# Patient Record
Sex: FEMALE | Race: BLACK OR AFRICAN AMERICAN | Employment: UNEMPLOYED | URBAN - METROPOLITAN AREA
[De-identification: names, ages, dates, MRNs, and addresses within clinical notes are randomized per-mention and may not be internally consistent; named-entity substitution may affect disease eponyms.]

---

## 2017-09-11 PROBLEM — F31.9 BIPOLAR DISORDER (HCC): Status: ACTIVE | Noted: 2017-09-11

## 2017-09-11 PROBLEM — F40.01 PANIC DISORDER WITH AGORAPHOBIA: Status: ACTIVE | Noted: 2017-09-11

## 2019-02-07 ENCOUNTER — HOSPITAL ENCOUNTER (EMERGENCY)
Age: 29
Discharge: HOME | End: 2019-02-07
Payer: MEDICAID

## 2019-02-07 VITALS
OXYGEN SATURATION: 98 % | RESPIRATION RATE: 14 BRPM | TEMPERATURE: 98.4 F | HEART RATE: 77 BPM | HEIGHT: 68 IN | BODY MASS INDEX: 20 KG/M2 | DIASTOLIC BLOOD PRESSURE: 70 MMHG | WEIGHT: 132 LBS | SYSTOLIC BLOOD PRESSURE: 108 MMHG

## 2019-02-07 DIAGNOSIS — G35 MULTIPLE SCLEROSIS (HCC): Primary | ICD-10-CM

## 2019-02-07 DIAGNOSIS — R52 BODY ACHES: ICD-10-CM

## 2019-02-07 LAB
ALBUMIN SERPL-MCNC: 4 G/DL (ref 3.5–5.2)
ALP BLD-CCNC: 52 U/L (ref 35–104)
ALT SERPL-CCNC: 9 U/L (ref 0–32)
ANION GAP SERPL CALCULATED.3IONS-SCNC: 10 MMOL/L (ref 7–16)
AST SERPL-CCNC: 13 U/L (ref 0–31)
BACTERIA: ABNORMAL /HPF
BASOPHILS ABSOLUTE: 0.05 E9/L (ref 0–0.2)
BASOPHILS RELATIVE PERCENT: 0.6 % (ref 0–2)
BILIRUB SERPL-MCNC: 0.4 MG/DL (ref 0–1.2)
BILIRUBIN URINE: ABNORMAL
BLOOD, URINE: NEGATIVE
BUN BLDV-MCNC: 12 MG/DL (ref 6–20)
C-REACTIVE PROTEIN: 0.3 MG/DL (ref 0–0.4)
CALCIUM SERPL-MCNC: 9.1 MG/DL (ref 8.6–10.2)
CHLORIDE BLD-SCNC: 103 MMOL/L (ref 98–107)
CLARITY: CLEAR
CO2: 23 MMOL/L (ref 22–29)
COLOR: YELLOW
CREAT SERPL-MCNC: 0.7 MG/DL (ref 0.5–1)
EOSINOPHILS ABSOLUTE: 0.1 E9/L (ref 0.05–0.5)
EOSINOPHILS RELATIVE PERCENT: 1.2 % (ref 0–6)
EPITHELIAL CELLS, UA: ABNORMAL /HPF
GFR AFRICAN AMERICAN: >60
GFR NON-AFRICAN AMERICAN: >60 ML/MIN/1.73
GLUCOSE BLD-MCNC: 92 MG/DL (ref 74–99)
GLUCOSE URINE: NEGATIVE MG/DL
HCG, URINE, POC: NEGATIVE
HCT VFR BLD CALC: 40.6 % (ref 34–48)
HEMOGLOBIN: 13.7 G/DL (ref 11.5–15.5)
IMMATURE GRANULOCYTES #: 0.02 E9/L
IMMATURE GRANULOCYTES %: 0.2 % (ref 0–5)
KETONES, URINE: 15 MG/DL
LACTIC ACID: 1.2 MMOL/L (ref 0.5–2.2)
LEUKOCYTE ESTERASE, URINE: ABNORMAL
LIPASE: 33 U/L (ref 13–60)
LYMPHOCYTES ABSOLUTE: 2.88 E9/L (ref 1.5–4)
LYMPHOCYTES RELATIVE PERCENT: 35.9 % (ref 20–42)
Lab: NORMAL
MCH RBC QN AUTO: 28.3 PG (ref 26–35)
MCHC RBC AUTO-ENTMCNC: 33.7 % (ref 32–34.5)
MCV RBC AUTO: 83.9 FL (ref 80–99.9)
MONOCYTES ABSOLUTE: 0.41 E9/L (ref 0.1–0.95)
MONOCYTES RELATIVE PERCENT: 5.1 % (ref 2–12)
NEGATIVE QC PASS/FAIL: NORMAL
NEUTROPHILS ABSOLUTE: 4.56 E9/L (ref 1.8–7.3)
NEUTROPHILS RELATIVE PERCENT: 57 % (ref 43–80)
NITRITE, URINE: NEGATIVE
PDW BLD-RTO: 14.8 FL (ref 11.5–15)
PH UA: 6 (ref 5–9)
PLATELET # BLD: 222 E9/L (ref 130–450)
PMV BLD AUTO: 11.8 FL (ref 7–12)
POSITIVE QC PASS/FAIL: NORMAL
POTASSIUM REFLEX MAGNESIUM: 4.2 MMOL/L (ref 3.5–5)
PROTEIN UA: NEGATIVE MG/DL
RBC # BLD: 4.84 E12/L (ref 3.5–5.5)
RBC UA: ABNORMAL /HPF (ref 0–2)
SEDIMENTATION RATE, ERYTHROCYTE: 2 MM/HR (ref 0–20)
SODIUM BLD-SCNC: 136 MMOL/L (ref 132–146)
SPECIFIC GRAVITY UA: 1.02 (ref 1–1.03)
TOTAL PROTEIN: 7.1 G/DL (ref 6.4–8.3)
UROBILINOGEN, URINE: 0.2 E.U./DL
WBC # BLD: 8 E9/L (ref 4.5–11.5)
WBC UA: ABNORMAL /HPF (ref 0–5)

## 2019-02-07 PROCEDURE — 86140 C-REACTIVE PROTEIN: CPT

## 2019-02-07 PROCEDURE — 83605 ASSAY OF LACTIC ACID: CPT

## 2019-02-07 PROCEDURE — 85025 COMPLETE CBC W/AUTO DIFF WBC: CPT

## 2019-02-07 PROCEDURE — 6360000002 HC RX W HCPCS: Performed by: NURSE PRACTITIONER

## 2019-02-07 PROCEDURE — 96374 THER/PROPH/DIAG INJ IV PUSH: CPT

## 2019-02-07 PROCEDURE — 2580000003 HC RX 258: Performed by: NURSE PRACTITIONER

## 2019-02-07 PROCEDURE — 80053 COMPREHEN METABOLIC PANEL: CPT

## 2019-02-07 PROCEDURE — 36415 COLL VENOUS BLD VENIPUNCTURE: CPT

## 2019-02-07 PROCEDURE — 99283 EMERGENCY DEPT VISIT LOW MDM: CPT

## 2019-02-07 PROCEDURE — 81001 URINALYSIS AUTO W/SCOPE: CPT

## 2019-02-07 PROCEDURE — 96375 TX/PRO/DX INJ NEW DRUG ADDON: CPT

## 2019-02-07 PROCEDURE — 83690 ASSAY OF LIPASE: CPT

## 2019-02-07 PROCEDURE — 85651 RBC SED RATE NONAUTOMATED: CPT

## 2019-02-07 RX ORDER — KETOROLAC TROMETHAMINE 30 MG/ML
15 INJECTION, SOLUTION INTRAMUSCULAR; INTRAVENOUS ONCE
Status: COMPLETED | OUTPATIENT
Start: 2019-02-07 | End: 2019-02-07

## 2019-02-07 RX ORDER — PREDNISONE 50 MG/1
500 TABLET ORAL DAILY
Qty: 91 TABLET | Refills: 0 | Status: SHIPPED | OUTPATIENT
Start: 2019-02-07 | End: 2019-02-07

## 2019-02-07 RX ORDER — PREDNISONE 50 MG/1
500 TABLET ORAL DAILY
Qty: 91 TABLET | Refills: 0 | Status: SHIPPED | OUTPATIENT
Start: 2019-02-07 | End: 2019-02-24

## 2019-02-07 RX ORDER — METHYLPREDNISOLONE SODIUM SUCCINATE 125 MG/2ML
125 INJECTION, POWDER, LYOPHILIZED, FOR SOLUTION INTRAMUSCULAR; INTRAVENOUS ONCE
Status: COMPLETED | OUTPATIENT
Start: 2019-02-07 | End: 2019-02-07

## 2019-02-07 RX ORDER — 0.9 % SODIUM CHLORIDE 0.9 %
1000 INTRAVENOUS SOLUTION INTRAVENOUS ONCE
Status: COMPLETED | OUTPATIENT
Start: 2019-02-07 | End: 2019-02-07

## 2019-02-07 RX ORDER — METOCLOPRAMIDE HYDROCHLORIDE 5 MG/ML
10 INJECTION INTRAMUSCULAR; INTRAVENOUS ONCE
Status: COMPLETED | OUTPATIENT
Start: 2019-02-07 | End: 2019-02-07

## 2019-02-07 RX ORDER — DIPHENHYDRAMINE HYDROCHLORIDE 50 MG/ML
25 INJECTION INTRAMUSCULAR; INTRAVENOUS ONCE
Status: COMPLETED | OUTPATIENT
Start: 2019-02-07 | End: 2019-02-07

## 2019-02-07 RX ORDER — DULOXETIN HYDROCHLORIDE 20 MG/1
20 CAPSULE, DELAYED RELEASE ORAL DAILY
COMMUNITY
End: 2019-06-25

## 2019-02-07 RX ADMIN — SODIUM CHLORIDE 1000 ML: 9 INJECTION, SOLUTION INTRAVENOUS at 14:23

## 2019-02-07 RX ADMIN — DIPHENHYDRAMINE HYDROCHLORIDE 25 MG: 50 INJECTION, SOLUTION INTRAMUSCULAR; INTRAVENOUS at 14:23

## 2019-02-07 RX ADMIN — METHYLPREDNISOLONE SODIUM SUCCINATE 125 MG: 125 INJECTION, POWDER, FOR SOLUTION INTRAMUSCULAR; INTRAVENOUS at 15:15

## 2019-02-07 RX ADMIN — METOCLOPRAMIDE 10 MG: 5 INJECTION, SOLUTION INTRAMUSCULAR; INTRAVENOUS at 14:24

## 2019-02-07 RX ADMIN — KETOROLAC TROMETHAMINE 15 MG: 30 INJECTION, SOLUTION INTRAMUSCULAR; INTRAVENOUS at 14:23

## 2019-02-07 ASSESSMENT — PAIN SCALES - GENERAL: PAINLEVEL_OUTOF10: 9

## 2019-06-25 ENCOUNTER — HOSPITAL ENCOUNTER (EMERGENCY)
Age: 29
Discharge: HOME | End: 2019-06-25
Attending: EMERGENCY MEDICINE
Payer: MEDICAID

## 2019-06-25 VITALS
TEMPERATURE: 98.9 F | DIASTOLIC BLOOD PRESSURE: 60 MMHG | BODY MASS INDEX: 20.49 KG/M2 | HEIGHT: 64 IN | OXYGEN SATURATION: 98 % | WEIGHT: 120 LBS | SYSTOLIC BLOOD PRESSURE: 110 MMHG | HEART RATE: 94 BPM | RESPIRATION RATE: 20 BRPM

## 2019-06-25 DIAGNOSIS — R10.13 ABDOMINAL PAIN, EPIGASTRIC: Primary | ICD-10-CM

## 2019-06-25 PROCEDURE — 6370000000 HC RX 637 (ALT 250 FOR IP): Performed by: EMERGENCY MEDICINE

## 2019-06-25 PROCEDURE — 99283 EMERGENCY DEPT VISIT LOW MDM: CPT

## 2019-06-25 RX ORDER — FAMOTIDINE 20 MG/1
20 TABLET, FILM COATED ORAL ONCE
Status: COMPLETED | OUTPATIENT
Start: 2019-06-25 | End: 2019-06-25

## 2019-06-25 RX ORDER — RANITIDINE 150 MG/1
150 TABLET ORAL 2 TIMES DAILY
Qty: 60 TABLET | Refills: 0 | Status: SHIPPED | OUTPATIENT
Start: 2019-06-25 | End: 2021-11-16

## 2019-06-25 RX ORDER — ONDANSETRON 4 MG/1
4 TABLET, ORALLY DISINTEGRATING ORAL ONCE
Status: COMPLETED | OUTPATIENT
Start: 2019-06-25 | End: 2019-06-25

## 2019-06-25 RX ORDER — PANTOPRAZOLE SODIUM 20 MG/1
20 TABLET, DELAYED RELEASE ORAL DAILY
COMMUNITY
End: 2021-11-16 | Stop reason: SDUPTHER

## 2019-06-25 RX ORDER — ONDANSETRON 4 MG/1
4 TABLET, ORALLY DISINTEGRATING ORAL EVERY 8 HOURS PRN
Qty: 24 TABLET | Refills: 0 | Status: SHIPPED | OUTPATIENT
Start: 2019-06-25 | End: 2021-11-16

## 2019-06-25 RX ADMIN — FAMOTIDINE 20 MG: 20 TABLET ORAL at 22:46

## 2019-06-25 RX ADMIN — ONDANSETRON 4 MG: 4 TABLET, ORALLY DISINTEGRATING ORAL at 22:46

## 2019-06-25 ASSESSMENT — PAIN DESCRIPTION - ORIENTATION: ORIENTATION: UPPER

## 2019-06-25 ASSESSMENT — PAIN DESCRIPTION - LOCATION: LOCATION: ABDOMEN

## 2019-06-25 ASSESSMENT — PAIN SCALES - GENERAL: PAINLEVEL_OUTOF10: 7

## 2019-06-25 ASSESSMENT — PAIN DESCRIPTION - DESCRIPTORS: DESCRIPTORS: BURNING

## 2019-06-25 ASSESSMENT — PAIN DESCRIPTION - PAIN TYPE: TYPE: ACUTE PAIN

## 2019-06-26 NOTE — ED PROVIDER NOTES
HPI:   Michelle Whitehead is a 34 y.o. female presenting to the ED for gastric pain beginning 2 weeks ago she was placed on ulcer medicine but is not any better [facility]. Paindoesnotradiate. [id number]. Itis associatedwithnausea. She has fibromyalgia and multiple sclerosis. After closer questioning the patient did admit that eating does make the pain better. ROS:   Unless otherwise stated in this report or unable to obtain because of the patient's clinical or mental status as evidenced by the medical record, this patients's positive and negative responses for Review of Systems, constitutional, psych, eyes, ENT, cardiovascular, respiratory, gastrointestinal, neurological, genitourinary, musculoskeletal, integument systems and systems related to the presenting problem are either stated in the preceding or were not pertinent or were negative for the symptoms and/or complaints related to the medical problem. --------------------------------------------- PAST HISTORY ---------------------------------------------  Past Medical History:  has a past medical history of Anxiety, Arm pain, Autism, Back pain, Depression, Fibromyalgia, GERD (gastroesophageal reflux disease), Headache(784.0), Irritable bowel syndrome, Leg pain, Migraines, [facility] Muscle pain, Muscle weakness, and Numbness and tingling. Past Surgical History:  has a past surgical history that includes Vagina surgery (2007) and hernia repair. Social History:  reports that she has never smoked. She has never used smokeless tobacco. She reports that she does not drink alcohol or use drugs. Family History: family history includes Asthma in her paternal grandfather; Diabetes in her maternal grandmother; Hypertension in her father, maternal grandmother, paternal grandfather, and paternal grandmother; Other in her maternal grandmother.      The patients home medications have been reviewed. Allergies: Codeine; Tape [adhesive tape]; and Morphine    -------------------------------------------------- RESULTS -------------------------------------------------  All laboratory and radiology results have been personally reviewed by myself   LABS:  No results found for this visit on 06/25/19. RADIOLOGY:  Interpreted by Radiologist.  No orders to display       ------------------------- NURSING NOTES AND VITALS REVIEWED ---------------------------   The nursing notes within the ED encounter and vital signs as below have been reviewed. /84   Pulse 120   Temp 98.9 °F (37.2 °C) (Oral)   Resp 24   Ht 5' 3.5\" (1.613 m)   Wt 120 lb (54.4 kg)   SpO2 99%   BMI 20.92 kg/m²   Oxygen Saturation Interpretation: Normal      ---------------------------------------------------PHYSICAL EXAM--------------------------------------      Constitutional/General: Alert and oriented x3, well appearing, non toxic in NAD  Head: NC/AT  Eyes: PERRL, EOMI  Mouth: Oropharynx clear, handling secretions, no trismus  Neck: Supple, full ROM,   Pulmonary: Lungs clear to auscultation bilaterally, no wheezes, rales, or rhonchi. Not in respiratory distress  Cardiovascular:  Regular rate and rhythm, no murmurs, gallops, or rubs. 2+ distal pulses  Abdomen: Soft, mild epigastric tenderness without rebound. , non distended,   Extremities: Moves all extremities x 4. Warm and well perfused  Skin: warm and dry without rash  Neurologic: GCS 15,  Psych: Normal Affect      ------------------------------ ED COURSE/MEDICAL DECISION MAKING----------------------  Medications - No data to display      Medical Decision Making:    Show with the epigastric discomfort improved by eating and also some nausea. We will begin her on Zofran and add Zantac to her ultimate antiulcer regimen. I believe she would benefit from an EGD and biopsy to rule out Helicobacter pylori. Counseling:    The emergency provider has

## 2019-06-26 NOTE — ED NOTES
States abd pain x one month. Has appt this week with Dr. Yaneth Burris.      Mere Venegas RN  06/25/19 [id number]

## 2019-08-08 ENCOUNTER — HOSPITAL ENCOUNTER (OUTPATIENT)
Dept: NUCLEAR MEDICINE | Age: 29
Discharge: HOME | End: 2019-08-08
Payer: MEDICAID

## 2019-08-08 DIAGNOSIS — R10.84 ABDOMINAL PAIN, GENERALIZED: ICD-10-CM

## 2019-08-08 DIAGNOSIS — R11.0 NAUSEA: ICD-10-CM

## 2019-08-08 PROCEDURE — 3430000000 HC RX DIAGNOSTIC RADIOPHARMACEUTICAL: Performed by: RADIOLOGY

## 2019-08-08 PROCEDURE — A9541 TC99M SULFUR COLLOID: HCPCS | Performed by: RADIOLOGY

## 2019-08-08 PROCEDURE — 78264 GASTRIC EMPTYING IMG STUDY: CPT

## 2019-08-08 RX ADMIN — Medication 1 MILLICURIE: at 10:10

## 2021-08-02 ENCOUNTER — OFFICE VISIT (OUTPATIENT)
Dept: CARDIOLOGY CLINIC | Age: 31
End: 2021-08-02
Payer: MEDICAID

## 2021-08-02 VITALS
OXYGEN SATURATION: 99 % | HEART RATE: 111 BPM | DIASTOLIC BLOOD PRESSURE: 62 MMHG | SYSTOLIC BLOOD PRESSURE: 110 MMHG | HEIGHT: 64 IN | WEIGHT: 140.4 LBS | RESPIRATION RATE: 19 BRPM | BODY MASS INDEX: 23.97 KG/M2

## 2021-08-02 DIAGNOSIS — R07.9 CHEST PAIN, UNSPECIFIED TYPE: Primary | ICD-10-CM

## 2021-08-02 PROCEDURE — 93000 ELECTROCARDIOGRAM COMPLETE: CPT | Performed by: INTERNAL MEDICINE

## 2021-08-02 PROCEDURE — G8420 CALC BMI NORM PARAMETERS: HCPCS | Performed by: INTERNAL MEDICINE

## 2021-08-02 PROCEDURE — 1036F TOBACCO NON-USER: CPT | Performed by: INTERNAL MEDICINE

## 2021-08-02 PROCEDURE — G8427 DOCREV CUR MEDS BY ELIG CLIN: HCPCS | Performed by: INTERNAL MEDICINE

## 2021-08-02 PROCEDURE — 99203 OFFICE O/P NEW LOW 30 MIN: CPT | Performed by: INTERNAL MEDICINE

## 2021-08-02 RX ORDER — MILNACIPRAN HYDROCHLORIDE 50 MG/1
TABLET, FILM COATED ORAL
COMMUNITY

## 2021-08-02 RX ORDER — MILNACIPRAN HYDROCHLORIDE 100 MG/1
100 TABLET, FILM COATED ORAL EVERY EVENING
COMMUNITY
Start: 2021-08-01

## 2021-08-02 RX ORDER — OXYCODONE HYDROCHLORIDE AND ACETAMINOPHEN 5; 325 MG/1; MG/1
TABLET ORAL
COMMUNITY
Start: 2021-07-16

## 2021-08-02 RX ORDER — FLUDROCORTISONE ACETATE 0.1 MG/1
TABLET ORAL
COMMUNITY
Start: 2021-07-12

## 2021-08-02 RX ORDER — CLONIDINE HYDROCHLORIDE 0.2 MG/1
TABLET ORAL
COMMUNITY
Start: 2021-07-21

## 2021-08-02 RX ORDER — NORETHINDRONE ACETATE AND ETHINYL ESTRADIOL 1; 5 MG/1; UG/1
TABLET ORAL
COMMUNITY

## 2021-08-02 RX ORDER — ACETAMINOPHEN 160 MG
TABLET,DISINTEGRATING ORAL
COMMUNITY
Start: 2021-07-14

## 2021-08-02 RX ORDER — ESCITALOPRAM OXALATE 20 MG/1
TABLET ORAL
COMMUNITY
Start: 2021-06-30

## 2021-08-02 RX ORDER — METOPROLOL SUCCINATE 25 MG/1
12.5 TABLET, EXTENDED RELEASE ORAL DAILY
Qty: 30 TABLET | Refills: 2 | Status: SHIPPED
Start: 2021-08-02 | End: 2021-11-16

## 2021-08-02 RX ORDER — BUPROPION HYDROCHLORIDE 150 MG/1
TABLET ORAL
COMMUNITY
Start: 2021-06-22

## 2021-08-02 ASSESSMENT — ENCOUNTER SYMPTOMS
CONSTIPATION: 0
SHORTNESS OF BREATH: 1
BLOOD IN STOOL: 0
NAUSEA: 1
BACK PAIN: 0
VOMITING: 0
ABDOMINAL PAIN: 0
COUGH: 0
DIARRHEA: 0
WHEEZING: 0

## 2021-08-02 NOTE — PROGRESS NOTES
OUTPATIENT CARDIOLOGY CONSULT    Name: Asuncion Stauffer    Age: 31 y.o. Date of Service: 8/2/2021    Reason for Consultation: Chest pain and POTS. Referring Physician: Perlita Calderon MD    History of Present Illness:  68-year-old female who is referred to me for evaluation due to chest pain and POTS. She has history of multiple sclerosis, fibromyalgia, lumbar radiculopathy, autism, bipolar disorder, panic disorder, GERD, irritable bowel syndrome and migraine. Patient is not active due to her multiple sclerosis. She has been complaining sweating, dizziness while standing up, headache, chest pain and shortness of breath over the past couple of months. She sometimes feels palpitations but denies loss of consciousness. She denies orthopnea, PND or lower extremity edema. Her chest pain can last 20 minutes to an hour and radiates to her back and is rated as 7/10 intensity. It has been occurring at rest but mainly while standing up. Patient was seen by her PCP recently and was found to be hypotensive. She was started on Florinef. EKG done today revealed sinus rhythm at 99 bpm, left atrial enlargement and incomplete right bundle branch block with nonspecific T wave changes. Review of Systems:  Review of Systems   Constitutional: Positive for fatigue. Negative for chills and fever. HENT: Negative for nosebleeds. Respiratory: Positive for shortness of breath. Negative for cough and wheezing. Cardiovascular: Positive for chest pain. Gastrointestinal: Positive for nausea. Negative for abdominal pain, blood in stool, constipation, diarrhea and vomiting. GERD. Genitourinary: Negative for dysuria and hematuria. Musculoskeletal: Negative for back pain, joint swelling and myalgias. Neurological: Positive for light-headedness. Negative for syncope. Imbalance. Psychiatric/Behavioral: The patient is not nervous/anxious.            Past Medical History:  Past Medical History: Diagnosis Date    Anxiety     hospitalized [facility] [place]    Arm pain     8 to 9/10 severity, radiates to hands    Autism     Back pain     8 to 9/10 severity    Depression     hospitalized [facility]    Fibromyalgia     GERD (gastroesophageal reflux disease)     Headache(784.0)     7/10 severity, few times a week    Irritable bowel syndrome     Leg pain     8 to 9/10 severity, radiates to feet    Migraines     10/10 severity, 2-3 per month    [facility]     Muscle pain     Muscle weakness     with loss of tone    Numbness and tingling     back, legs, arms       Past Surgical History:  Past Surgical History:   Procedure Laterality Date    HERNIA REPAIR      Pt was 5 yrs old   [employer] VAGINA SURGERY  2007    Abscess,  Dr. Tellez Prophet       Family History:  Family History   Problem Relation Age of Onset    Diabetes Maternal Grandmother     Hypertension Maternal Grandmother     Other Maternal Grandmother         lymphoma    Asthma Paternal Grandfather     Hypertension Paternal Grandfather     Hypertension Father     Hypertension Paternal Grandmother        Social History:  Social History     Socioeconomic History    Marital status: Single     Spouse name: Not on file    Number of children: Not on file    Years of education: Not on file    Highest education level: Not on file   Occupational History    Not on file   Tobacco Use    Smoking status: Never Smoker    Smokeless tobacco: Never Used   Substance and Sexual Activity    Alcohol use: No    Drug use: No    Sexual activity: Not on file   Other Topics Concern    Not on file   Social History Narrative    Not on file     Social Determinants of Health     Financial Resource Strain:     Difficulty of Paying Living Expenses:    Food Insecurity:     Worried About Running Out of Food in the Last Year:     Ran Out of Food in the Last Year:    Transportation Needs:     Lack of Transportation (Medical):      Lack of Transportation (Non-Medical):    Physical Activity:     Days of Exercise per Week:     Minutes of Exercise per Session:    Stress:     Feeling of Stress :    Social Connections:     Frequency of Communication with Friends and Family:     Frequency of Social Gatherings with Friends and Family:     Attends Yarsani Services:     Active Member of Clubs or Organizations:     Attends Club or Organization Meetings:     Marital Status:    Intimate Partner Violence:     Fear of Current or Ex-Partner:     Emotionally Abused:     Physically Abused:     Sexually Abused: Allergies: Allergies   Allergen Reactions    Codeine Itching    Tape Sharl Gypsum Tape] Itching    Morphine Itching       Current Medications:  Current Outpatient Medications   Medication Sig Dispense Refill    pantoprazole (PROTONIX) 20 MG tablet Take 20 mg by mouth daily      ranitidine (ZANTAC) 150 MG tablet Take 1 tablet by mouth 2 times daily 60 tablet 0    ondansetron (ZOFRAN ODT) 4 MG disintegrating tablet Take 1 tablet by mouth every 8 hours as needed for Nausea or Vomiting 24 tablet 0    clonazePAM (KLONOPIN) 1 MG tablet Take 1 tablet by mouth 2 times daily as needed for Anxiety . 30 tablet 1    Dimethyl Fumarate (TECFIDERA) 240 MG CPDR   Take by mouth 2 times daily       drospirenone-ethinyl estradiol (GIANVI) 3-0.02 MG per tablet Take 1 tablet by mouth daily. No current facility-administered medications for this visit. Physical Exam:  There were no vitals taken for this visit. Wt Readings from Last 3 Encounters:   06/25/19 120 lb (54.4 kg)   02/07/19 132 lb (59.9 kg)   09/11/17 102 lb (46.3 kg)     Physical Exam:  There were no vitals taken for this visit. Wt Readings from Last 3 Encounters:   06/25/19 120 lb (54.4 kg)   02/07/19 132 lb (59.9 kg)   09/11/17 102 lb (46.3 kg)     Physical Exam  Constitutional:       General: She is not in acute distress. Appearance: She is well-developed.    HENT: Head: Normocephalic and atraumatic. Neck:      Vascular: No carotid bruit or JVD. Cardiovascular:      Rate and Rhythm: Normal rate and regular rhythm. Heart sounds: No murmur heard. No friction rub. No gallop. Pulmonary:      Breath sounds: Normal breath sounds. No wheezing or rales. Chest:      Chest wall: No tenderness. Abdominal:      General: Bowel sounds are normal. There is no distension. Palpations: Abdomen is soft. There is no mass. Tenderness: There is no abdominal tenderness. Comments: No abdominal bruit. Musculoskeletal:      Cervical back: Neck supple. Right lower leg: No edema. Left lower leg: No edema. Skin:     General: Skin is warm and dry. Neurological:      Mental Status: She is alert and oriented to person, place, and time. Laboratory Tests:  Lab Results   Component Value Date    CREATININE 0.7 02/07/2019    BUN 12 02/07/2019     02/07/2019    K 4.2 02/07/2019     02/07/2019    CO2 23 02/07/2019       Lab Results   Component Value Date    WBC 8.0 02/07/2019    HGB 13.7 02/07/2019    HCT 40.6 02/07/2019    MCV 83.9 02/07/2019     02/07/2019     Lab Results   Component Value Date    ALT 9 02/07/2019    AST 13 02/07/2019    ALKPHOS 52 02/07/2019    BILITOT 0.4 02/07/2019     Lab Results   Component Value Date    CKMB <1.0 08/02/2015    TROPONINI <0.01 07/28/2017    TROPONINI <0.01 03/21/2017    TROPONINI <0.01 08/02/2015     Lab Results   Component Value Date    INR 1.0 06/22/2015    PROTIME 10.5 06/22/2015     Lab Results   Component Value Date    TSH 3.280 08/05/2017     Lab Results   Component Value Date    LABA1C 5.3 02/02/2012           ASSESSMENT / PLAN:  -Chest pain: It sounded atypical in nature. It could be due to fibromyalgia, GERD, irritable bowel syndrome, or panic attacks. -POTS: Probably due to autonomic dysfunction due to multiple sclerosis. -Multiple sclerosis. -GERD.   -Irritable bowel syndrome.  -Fibromyalgia.  -Lumbar radiculopathy.  -Bipolar disorder.  -Panic disorder.  -Autism. We will start patient on Toprol 12.5 mg daily. Will arrange for the patient to have an echocardiogram to rule out structural heart disease. Patient was advised to stay hydrated and to avoid exposure to hot weather or hot shower. Patient was advised to watch for lower extremity edema since she is on Florinef. If patient develops lower extremity edema, consider switching Florinef to midodrine. Patient was advised to avoid adding salt to food. Consider referral for EP consultation if normal echocardiogram and if patient remains symptomatic. Will need blood work-up at her PCP office including CBC, kidney function and electrolytes, TSH and metanephrine level. Thank you for allowing me to participate in your patient's care. Please feel free to contact me if you have any questions or concerns.     Darrell Albright MD [facility] [place], [address] [facility]

## 2021-09-08 ENCOUNTER — HOSPITAL ENCOUNTER (OUTPATIENT)
Dept: CARDIOLOGY | Age: 31
Discharge: HOME | End: 2021-09-08
Payer: MEDICAID

## 2021-09-08 DIAGNOSIS — R07.9 CHEST PAIN, UNSPECIFIED TYPE: ICD-10-CM

## 2021-09-08 LAB
LV EF: 58 %
LVEF MODALITY: NORMAL

## 2021-09-08 PROCEDURE — 93306 TTE W/DOPPLER COMPLETE: CPT

## 2021-10-11 ENCOUNTER — TELEPHONE (OUTPATIENT)
Dept: NON INVASIVE DIAGNOSTICS | Age: 31
End: 2021-10-11

## 2021-10-11 NOTE — TELEPHONE ENCOUNTER
Called and left a message to call the office back to schedule a new pt appointment with .   Dr Beryl Delgado referring for POTS  Records scanned

## 2021-11-16 ENCOUNTER — OFFICE VISIT (OUTPATIENT)
Dept: NON INVASIVE DIAGNOSTICS | Age: 31
End: 2021-11-16
Payer: MEDICAID

## 2021-11-16 VITALS
DIASTOLIC BLOOD PRESSURE: 56 MMHG | HEART RATE: 106 BPM | WEIGHT: 140 LBS | HEIGHT: 64 IN | BODY MASS INDEX: 23.9 KG/M2 | SYSTOLIC BLOOD PRESSURE: 80 MMHG | RESPIRATION RATE: 16 BRPM

## 2021-11-16 DIAGNOSIS — M79.7 FIBROMYALGIA: Primary | ICD-10-CM

## 2021-11-16 DIAGNOSIS — I95.89 OTHER SPECIFIED HYPOTENSION: ICD-10-CM

## 2021-11-16 DIAGNOSIS — R00.0 TACHYCARDIA: ICD-10-CM

## 2021-11-16 DIAGNOSIS — R00.2 PALPITATIONS: ICD-10-CM

## 2021-11-16 DIAGNOSIS — G35 MULTIPLE SCLEROSIS (HCC): ICD-10-CM

## 2021-11-16 PROCEDURE — 99204 OFFICE O/P NEW MOD 45 MIN: CPT | Performed by: INTERNAL MEDICINE

## 2021-11-16 PROCEDURE — G8484 FLU IMMUNIZE NO ADMIN: HCPCS | Performed by: INTERNAL MEDICINE

## 2021-11-16 PROCEDURE — G8420 CALC BMI NORM PARAMETERS: HCPCS | Performed by: INTERNAL MEDICINE

## 2021-11-16 PROCEDURE — G8427 DOCREV CUR MEDS BY ELIG CLIN: HCPCS | Performed by: INTERNAL MEDICINE

## 2021-11-16 PROCEDURE — 1036F TOBACCO NON-USER: CPT | Performed by: INTERNAL MEDICINE

## 2021-11-16 PROCEDURE — 93000 ELECTROCARDIOGRAM COMPLETE: CPT | Performed by: INTERNAL MEDICINE

## 2021-11-16 RX ORDER — METOPROLOL SUCCINATE 25 MG/1
25 TABLET, EXTENDED RELEASE ORAL DAILY
Qty: 30 TABLET | Refills: 2 | Status: SHIPPED
Start: 2021-11-16 | End: 2021-11-29 | Stop reason: SDUPTHER

## 2021-11-16 RX ORDER — OMEPRAZOLE 40 MG/1
CAPSULE, DELAYED RELEASE ORAL
COMMUNITY
Start: 2021-09-13

## 2021-11-16 RX ORDER — FAMOTIDINE 20 MG/1
20 TABLET, FILM COATED ORAL 2 TIMES DAILY
COMMUNITY

## 2021-11-16 ASSESSMENT — ENCOUNTER SYMPTOMS
NAUSEA: 0
ABDOMINAL DISTENTION: 0
EYE REDNESS: 0
SHORTNESS OF BREATH: 0
SINUS PRESSURE: 0
WHEEZING: 0
COLOR CHANGE: 0
ABDOMINAL PAIN: 0
CHEST TIGHTNESS: 0
DIARRHEA: 0
COUGH: 0

## 2021-11-16 NOTE — PATIENT INSTRUCTIONS
- Make all postural changes from lying to sitting or sitting to standing slowly. - Drink to 2.0 -2.5 L of fluids per day. - Increase sodium in the diet to 3 - 5 g per day. - Avoid large meals which can cause low blood pressure during digestion.     - Avoid alcohol and excessive caffeine intake. - Perform lower extremity exercises to improve strength of the leg muscles. - Use physical counter maneuvers such as leg crossing, or leg raising and resting the leg on a chair.      - Raise the head of the bed by 6 to 10 inches. - Use custom fitted elastic support stockings.

## 2021-11-16 NOTE — PROGRESS NOTES
Cardiac Electrophysiology Outpatient Progress Note    Porfirio Jordan  1990  Date of Service: 11/16/2021  Referring Provider/PCP: Stefania Delvalle MD  Chief Complaint:   Chief Complaint   Patient presents with    Tachycardia     New Patient POTS-  Patient complains of dizziness         HISTORY OF PRESENT ILLNESS    Porfirio Jordan presents to the office today for the management of these Electrophysiology conditions: POTS      She has history of multiple sclerosis, fibromyalgia, lumbar radiculopathy, autism, bipolar disorder, panic disorder, GERD, irritable bowel syndrome and migraine. She is not active due to her multiple sclerosis. She has been complaining sweating, dizziness while standing up, headache, chest pain and shortness of breath over the past couple of months. No LOC. Patient was seen by her PCP recently and was found to be hypotensive. She was started on Florinef. She saw Dr Phong Franklin 8/2/21 : Toprol 12.5mg daily added, hydration encouraged    11/16/21 : She reports her symptoms of sweating, dizziness while standing has been ongoing for the past 2-3 years. Her symptoms occur almost daniels. In terms of activity she is not really active due to her MS and fibromyalgia. Since starting on Toprol and Florinef in August 2021, she reports it has somewhat helped her symptoms. She drinks 2 (30oz) bottles of water a day and has limited her salt intake. Today she presents today in SR. She denies a family history of SCD.     Patient Active Problem List    Diagnosis Date Noted    Panic disorder with agoraphobia 09/11/2017    Bipolar disorder ([facility] [address] 09/11/2017    Chest pain 08/31/2015    Multiple sclerosis ([facility] [address] 08/31/2015    Autism 08/31/2015    Disc displacement, lumbar 02/04/2013    Fibromyalgia 02/01/2013    Lumbar radiculopathy 02/01/2013    Decreased muscle tone 12/17/2012    Weakness 12/17/2012       Family History   Problem Relation Age of Onset    Diabetes Maternal Grandmother     Hypertension Maternal Grandmother     Other Maternal Grandmother         lymphoma    Asthma Paternal Grandfather     Hypertension Paternal Grandfather     Hypertension Father     Hypertension Paternal Grandmother        SOCIAL HISTORY   Social History     Socioeconomic History    Marital status: Single     Spouse name: Not on file    Number of children: Not on file    Years of education: Not on file    Highest education level: Not on file   Occupational History    Not on file   Tobacco Use    Smoking status: Never Smoker    Smokeless tobacco: Never Used   Vaping Use    Vaping Use: Never used   Substance and Sexual Activity    Alcohol use: Yes     Comment: occ    Drug use: No    Sexual activity: Not on file   Other Topics Concern    Not on file   Social History Narrative    Not on file     Social Determinants of Health     Financial Resource Strain:     Difficulty of Paying Living Expenses: Not on file   Food Insecurity:     Worried About [employer] in the Last Year: Not on file    [place] [employer] in the Last Year: Not on file   Transportation Needs:     Lack of Transportation (Medical): Not on file    Lack of Transportation (Non-Medical):  Not on file   Physical Activity:     Days of Exercise per Week: Not on file    Minutes of Exercise per Session: Not on file   Stress:     Feeling of Stress : Not on file   Social Connections:     Frequency of Communication with Friends and Family: Not on file    Frequency of Social Gatherings with Friends and Family: Not on file    Attends Christian Services: Not on file    Active Member of Clubs or Organizations: Not on file    Attends Club or Organization Meetings: Not on file    Marital Status: Not on file   Intimate Partner Violence:     Fear of Current or Ex-Partner: Not on file    Emotionally Abused: Not on file    Physically Abused: Not on file    Sexually Abused: Not on file   Housing Stability:     Unable to Pay for Housing in the Last Year: Not on file    Number of Places Lived in the Last Year: Not on file    Unstable Housing in the Last Year: Not on file         Past Surgical History:   Procedure Laterality Date    HERNIA REPAIR      Pt was 5 yrs old   [facility]  2007    Abscess,  Dr. Kathie Fabian       Current Outpatient Medications   Medication Sig Dispense Refill    omeprazole (PRILOSEC) 40 MG delayed release capsule TAKE 1 CAPSULE BY MOUTH EVERY DAY      famotidine (PEPCID) 20 MG tablet Take 20 mg by mouth 2 times daily      metoprolol succinate (TOPROL XL) 25 MG extended release tablet Take 1 tablet by mouth daily 30 tablet 2    buPROPion (WELLBUTRIN XL) 150 MG extended release tablet TAKE 1 TABLET BY MOUTH EVERY DAY      escitalopram (LEXAPRO) 20 MG tablet TAKE 1 TABLET BY MOUTH EVERY DAY      SAVELLA 100 MG TABS 100 mg every evening       milnacipran HCl (SAVELLA) 50 MG TABS Take by mouth every morning (before breakfast)       cloNIDine (CATAPRES) 0.2 MG tablet TAKE 1 TABLET BY MOUTH AT BEDTIME      fludrocortisone (FLORINEF) 0.1 MG tablet TAKE 1 TABLET BY MOUTH EVERY MORNING      Cholecalciferol (VITAMIN D3) 50 MCG (2000 UT) CAPS TAKE 1 CAPSULE BY MOUTH EVERY DAY      oxyCODONE-acetaminophen (PERCOCET) 5-325 MG per tablet take 1 tablet by mouth every 8 hours if needed for pain      norethindrone-ethinyl estradiol (JINTELI) 1-5 MG-MCG TABS per tablet Take by mouth      Dimethyl Fumarate (TECFIDERA) 240 MG CPDR Take by mouth 2 times daily        No current facility-administered medications for this visit. Allergies   Allergen Reactions    Codeine Itching    Tape Ricke Guard Tape] Itching    Morphine Itching           ROS:   Review of Systems   Constitutional: Negative for fatigue and fever. HENT: Negative for congestion, nosebleeds and sinus pressure. Eyes: Negative for redness and visual disturbance. Respiratory: Negative for cough, chest tightness, shortness of breath and wheezing. Cardiovascular: Negative for chest pain, palpitations and leg swelling. Gastrointestinal: Negative for abdominal distention, abdominal pain, diarrhea and nausea. Endocrine: Negative for cold intolerance, heat intolerance, polydipsia and polyphagia. Genitourinary: Negative for difficulty urinating, frequency and urgency. Musculoskeletal: Negative for arthralgias, joint swelling and myalgias. Skin: Negative for color change and wound. Neurological: Negative for dizziness, syncope, weakness and numbness. Psychiatric/Behavioral: Negative for agitation, behavioral problems, confusion, decreased concentration, hallucinations and suicidal ideas. The patient is not nervous/anxious. PHYSICAL EXAM:  Vitals:    11/16/21 1304 11/16/21 1307 11/16/21 1311   BP: 98/60 88/60 (!) 80/56   Site: Right Upper Arm Right Upper Arm Right Upper Arm   Position: Supine Sitting Standing   Cuff Size: Medium Adult Medium Adult Medium Adult   Pulse: 85 91 106   Resp: 16     Weight: 140 lb (63.5 kg)     Height: 5' 3.5\" (1.613 m)       Physical Exam  Vitals reviewed. Constitutional:       Appearance: Normal appearance. HENT:      Head: Normocephalic. Mouth/Throat:      Mouth: Mucous membranes are moist.      Pharynx: Oropharynx is clear. Eyes:      Conjunctiva/sclera: Conjunctivae normal.   Neck:      Vascular: No carotid bruit. Cardiovascular:      Rate and Rhythm: Normal rate and regular rhythm. Pulses: Normal pulses. Heart sounds: Normal heart sounds. Pulmonary:      Effort: Pulmonary effort is normal.      Breath sounds: Normal breath sounds. No rales. Chest:      Chest wall: No tenderness. Abdominal:      General: Bowel sounds are normal.      Palpations: Abdomen is soft. Musculoskeletal:         General: Normal range of motion. Cervical back: Normal range of motion and neck supple. Skin:     General: Skin is warm. Neurological:      General: No focal deficit present. Mental Status: She is alert and oriented to person, place, and time. Psychiatric:         Mood and Affect: Mood normal.         Behavior: Behavior normal.         Thought Content: Thought content normal.          Pertinent Labs:  CBC:   WBC (E9/L)   Date Value   02/07/2019 8.0   08/05/2017 6.0   07/28/2017 6.6     Hemoglobin (g/dL)   Date Value   02/07/2019 13.7   08/05/2017 14.1   07/28/2017 13.2     Hematocrit (%)   Date Value   02/07/2019 40.6   08/05/2017 41.8   07/28/2017 39.1     Platelets (B9/B)   Date Value   02/07/2019 222   08/05/2017 221   07/28/2017 215      BMP:   Sodium (mmol/L)   Date Value   02/07/2019 136   08/05/2017 138   07/28/2017 137     Potassium (mmol/L)   Date Value   08/05/2017 4.2   07/28/2017 3.4 (L)   03/21/2017 3.7     Potassium reflex Magnesium (mmol/L)   Date Value   02/07/2019 4.2     Chloride (mmol/L)   Date Value   02/07/2019 103   08/05/2017 101   07/28/2017 99     CO2 (mmol/L)   Date Value   02/07/2019 23   08/05/2017 21 (L)   07/28/2017 21 (L)     BUN (mg/dL)   Date Value   02/07/2019 12   08/05/2017 8   07/28/2017 10     CREATININE (mg/dL)   Date Value   02/07/2019 0.7   08/05/2017 0.7   07/28/2017 0.8     Glucose (mg/dL)   Date Value   02/07/2019 92   08/05/2017 83   07/28/2017 75   02/02/2012 65 (L)     Calcium (mg/dL)   Date Value   02/07/2019 9.1   08/05/2017 9.1   07/28/2017 9.2      INR:   INR (no units)   Date Value   06/22/2015 1.0      BNP: No results found for: BNP   TSH:   TSH (uIU/mL)   Date Value   08/05/2017 3.280   07/28/2017 2.730   02/02/2012 1.145      Cardiac Injury Profile:   CK-MB (ng/mL)   Date Value   08/02/2015 <1.0     Troponin (ng/mL)   Date Value   07/28/2017 <0.01     Lipid Profile: No results found for: TRIG, HDL, LDLCALC, CHOL   Hemoglobin A1C:   Hemoglobin A1C (%)   Date Value   02/02/2012 5.3       Pertinent Cardiac Testing:     TTE: 9/2021:  Summary   Technically adequate study. Echocardiogram within normal limits.    EF 55-60%    ECG 11/16/2021: normal EKG, normal sinus rhythm, 85 bpm - see scanned cardiology    I have independently reviewed all of the ECGs and rhythm strips per above    I have personally reviewed the laboratory, cardiac diagnostic and radiographic testing as outlined above: We have requested previous records. 1. Fibromyalgia    2. Palpitations    3. Multiple sclerosis ([facility] [address]    4. Tachycardia    5. Other specified hypotension         ASSESSMENT & PLAN    1. Lightheadedness/dizziness  - ongoing for the past 2-3 years  - symptoms occur almost daniels  - drinks 2 (30oz) bottles of water a day and has limited her salt intake  - Advised life style modifications as below     - Make all postural changes from lying to sitting or sitting to standing slowly. - Drink to 2.0 -2.5 L of fluids per day. - Increase sodium in the diet to 3 - 5 g per day. - Avoid large meals which can cause low blood pressure during digestion.     - Avoid alcohol and excessive caffeine intake. - Perform lower extremity exercises to improve strength of the leg muscles. - Use physical counter maneuvers such as leg crossing, or leg raising and resting the leg on a chair.      - Raise the head of the bed by 6 to 10 inches. - Use custom fitted elastic support stockings. 2. Hypotensive  - started on Florinef. She saw Dr Yanique Thornton 8/2/21  - Toprol 12.5mg daily added  - hydration encouraged    3. Autism    4. bipolar disorder    5. panic disorder    6. MS  - Following at the [place]  1. Will schedule for tilt table test.   2. Aggressive hydration and increase salt intake   3. Lifestyle modifications. 4. Increase Toprol XL to 25mg daily. 5. Obtain a 24 hour HM. 6. OV after tilt table test. Encouraged the patient to call the office for any questions or concerns. Thank you for allowing me to participate in your patient's care.     I have spent a total of 45 minutes with the patient and his/her family reviewing the above stated recommendations. A total of >50% of that time involved face-to-face time providing counseling and or coordination of care with the other providers.     Karen Elmore MD  Cardiac Electrophysiology  [address] [place] [address]

## 2021-11-22 NOTE — PROGRESS NOTES
Pt was seen in the office today for a 24 hour holter monitor (company: [employer]) per Dr. Rad Borden. Monitor applied and instructions given. Pt stated understanding.      Serial #: [id number]

## 2021-11-23 ENCOUNTER — TELEPHONE (OUTPATIENT)
Dept: CARDIAC CATH/INVASIVE PROCEDURES | Age: 31
End: 2021-11-23

## 2021-11-24 DIAGNOSIS — R00.2 PALPITATIONS: ICD-10-CM

## 2021-11-24 DIAGNOSIS — R00.0 TACHYCARDIA: ICD-10-CM

## 2021-11-26 ENCOUNTER — TELEPHONE (OUTPATIENT)
Dept: CARDIAC CATH/INVASIVE PROCEDURES | Age: 31
End: 2021-11-26

## 2021-11-26 NOTE — TELEPHONE ENCOUNTER
Reminded patient of scheduled procedure on 11/29/21. Instructions given and COVID questionnaire completed.

## 2021-11-29 ENCOUNTER — HOSPITAL ENCOUNTER (OUTPATIENT)
Dept: CARDIAC CATH/INVASIVE PROCEDURES | Age: 31
Discharge: HOME | End: 2021-11-29
Payer: MEDICAID

## 2021-11-29 DIAGNOSIS — R00.0 TACHYCARDIA: ICD-10-CM

## 2021-11-29 PROCEDURE — 93660 TILT TABLE EVALUATION: CPT

## 2021-11-29 PROCEDURE — 93660 TILT TABLE EVALUATION: CPT | Performed by: INTERNAL MEDICINE

## 2021-11-29 RX ORDER — METOPROLOL SUCCINATE 25 MG/1
25 TABLET, EXTENDED RELEASE ORAL 2 TIMES DAILY
Qty: 30 TABLET | Refills: 2 | Status: SHIPPED | OUTPATIENT
Start: 2021-11-29 | End: 2022-02-28

## 2021-11-29 NOTE — OP NOTE
Operative Note  [address]  [place] and [address] Electrophysiology  Procedure Report  PATIENT: Jack Green  MEDICAL RECORD NUMBER: [id number]  DATE OF PROCEDURE:  11/29/2021  ATTENDING ELECTROPHYSIOLOGIST:  Naldo Bucio MD    PROCEDURE:    1. Tilt Table Testing    INDICATION:  Syncope/near syncope    PROCEDURE PERFORMED BY: Naldo Bucio MD    PROCEDURE TIME: Thirty minutes    COMPLICATIONS: None immediately apparent    DESCRIPTION OF PROCEDURE: The risks, benefits, and alternatives to the procedure were discussed with the patient, and informed consent was obtained. The patient was brought to the Tilt table lab. Baseline supine blood pressures and heart rates were obtained. The baseline blood pressure was 99/57 mmHg and baseline heart rate is 88 bpm.  After 5 minutes in the supine position, the patient was placed in the 70 degree head-upright position. After 3 mins, /70 . After approximately 7 mins, she felt chest pain, dizziness, sob, presyncopal.   The susi BP recorded was 78/38 mmHg with a heart rate of 107 bpm.  The patient was placed back down in the supine position. At the conclusion of tilt table testing, the patient's blood pressure and heart rate were back at baseline. SUMMARY:  1. Positive tilt table test for Neurocardiogenic syncope. Patient had vitals consistent with Postural orthostatic Tachycardia at the onset of Tilt testing. After 6 mins, BP dropped to 78/38    RECOMMENDATIONS:  1. Maintain adequate hydration. Drink approximately 2-2.5 L of non-caffeinated beverage/ per day. 2. Limit caffeine and alcohol use. 3. Liberalize salt intake. 4. If prescribed, please wear the support stockings or compression socks as instructed. 5. If medication is prescribed, please take it as instructed. 6. Other life style modifications as per discharge instruction. 7. Wean clonidine  8.  Increase Metoprolol to 25 mg bid    Naldo Moran

## 2022-01-26 ENCOUNTER — OFFICE VISIT (OUTPATIENT)
Dept: ENDOCRINOLOGY | Age: 32
End: 2022-01-26
Payer: MEDICAID

## 2022-01-26 VITALS
HEART RATE: 95 BPM | WEIGHT: 149 LBS | BODY MASS INDEX: 26.4 KG/M2 | OXYGEN SATURATION: 95 % | DIASTOLIC BLOOD PRESSURE: 83 MMHG | SYSTOLIC BLOOD PRESSURE: 122 MMHG | HEIGHT: 63 IN

## 2022-01-26 DIAGNOSIS — R00.2 PALPITATION: Primary | ICD-10-CM

## 2022-01-26 DIAGNOSIS — R61 SWEATING PROFUSELY: ICD-10-CM

## 2022-01-26 DIAGNOSIS — R00.2 PALPITATION: ICD-10-CM

## 2022-01-26 DIAGNOSIS — N91.5 OLIGOMENORRHEA, UNSPECIFIED TYPE: ICD-10-CM

## 2022-01-26 LAB
T4 FREE: 1.35 NG/DL (ref 0.93–1.7)
TSH SERPL DL<=0.05 MIU/L-ACNC: 3.82 UIU/ML (ref 0.27–4.2)

## 2022-01-26 PROCEDURE — G8484 FLU IMMUNIZE NO ADMIN: HCPCS | Performed by: INTERNAL MEDICINE

## 2022-01-26 PROCEDURE — G8419 CALC BMI OUT NRM PARAM NOF/U: HCPCS | Performed by: INTERNAL MEDICINE

## 2022-01-26 PROCEDURE — G8427 DOCREV CUR MEDS BY ELIG CLIN: HCPCS | Performed by: INTERNAL MEDICINE

## 2022-01-26 PROCEDURE — 99204 OFFICE O/P NEW MOD 45 MIN: CPT | Performed by: INTERNAL MEDICINE

## 2022-01-26 PROCEDURE — 1036F TOBACCO NON-USER: CPT | Performed by: INTERNAL MEDICINE

## 2022-01-26 NOTE — PROGRESS NOTES
[address] [facility] Department of Endocrinology Diabetes and Metabolism   [address] [facility], [address] [place] [address] [address] [facility] [address]   Phone: [phone]  Fax: [phone]    Date of Service: 1/26/2022  Primary Care Physician: Eneida Martinez MD  Referring physician: Reid Ch MD  Provider: Radha Chapa MD     Reason for the visit:  Hot flushes     History of Present Illness:  History was taken from the patient who is a very good historian. Adis Christy is a very pleasant 36 y.o. female seen today for evaluation and Management of irregular cycle and hot flushes     Menstrual cycle started at age 8 and has been irregular from the beginning. About 4-5 months  ago she started c/o profuse sweating and hot flushes    She is currently on OCP and not planning for pregnancy in the near future. Sweating episodes 2-3 times every day (day and night) and last 5-15 minutes. The patient denied any h/o headache during the episodes, wheezing or bronchospasm. No history of skin rash or skin tags. No history of palpitation or tremors and no neck pain or swelling. No history of diarrhea. She didn't check her BP during the episodes. No changes in shoe/hat size.  No h/o cough or SOB     PAST MEDICAL HISTORY   Past Medical History:   Diagnosis Date    Anxiety     hospitalized Marya Dakin    Arm pain     8 to 9/10 severity, radiates to hands    Autism     Back pain     8 to 9/10 severity    Depression     hospitalized Reji Albert    Fibromyalgia     GERD (gastroesophageal reflux disease)     Headache(784.0)     7/10 severity, few times a week    Irritable bowel syndrome     Leg pain     8 to 9/10 severity, radiates to feet    Migraines     10/10 severity, 2-3 per month    MS (multiple sclerosis) ([facility])     Muscle pain     Muscle weakness     with loss of tone    Numbness and tingling     back, legs, arms       PAST SURGICAL HISTORY   Past Surgical History:   Procedure Laterality Date    HERNIA REPAIR Pt was 5 yrs old    OTHER SURGICAL HISTORY  11/29/2021    Tilt Table Test    VAGINA SURGERY  01/01/2007    Abscess,  Dr. Shirin Kilpatrick   Tobacco:   reports that she has never smoked. She has never used smokeless tobacco.  Alcohol:   reports current alcohol use. Drugs:   reports no history of drug use.     FAMILY HISTORY   Family History   Problem Relation Age of Onset    Diabetes Maternal Grandmother     Hypertension Maternal Grandmother     Other Maternal Grandmother         lymphoma    Asthma Paternal Grandfather     Hypertension Paternal Grandfather     Hypertension Father     Hypertension Paternal Grandmother        ALLERGIES AND DRUG REACTIONS   Allergies   Allergen Reactions    Codeine Itching    Tape [Adhesive Tape] Itching    Morphine Itching       CURRENT MEDICATIONS   Current Outpatient Medications   Medication Sig Dispense Refill    metoprolol succinate (TOPROL XL) 25 MG extended release tablet Take 1 tablet by mouth 2 times daily 30 tablet 2    omeprazole (PRILOSEC) 40 MG delayed release capsule TAKE 1 CAPSULE BY MOUTH EVERY DAY      famotidine (PEPCID) 20 MG tablet Take 20 mg by mouth 2 times daily      buPROPion (WELLBUTRIN XL) 150 MG extended release tablet TAKE 1 TABLET BY MOUTH EVERY DAY      escitalopram (LEXAPRO) 20 MG tablet TAKE 1 TABLET BY MOUTH EVERY DAY      milnacipran HCl (SAVELLA) 50 MG TABS Take by mouth every morning (before breakfast)       fludrocortisone (FLORINEF) 0.1 MG tablet TAKE 1 TABLET BY MOUTH EVERY MORNING      Cholecalciferol (VITAMIN D3) 50 MCG (2000 UT) CAPS TAKE 1 CAPSULE BY MOUTH EVERY DAY      oxyCODONE-acetaminophen (PERCOCET) 5-325 MG per tablet take 1 tablet by mouth every 8 hours if needed for pain      norethindrone-ethinyl estradiol (JINTELI) 1-5 MG-MCG TABS per tablet Take by mouth      Dimethyl Fumarate (TECFIDERA) 240 MG CPDR Take by mouth 2 times daily       SAVELLA 100 MG TABS 100 mg every evening       cloNIDine (CATAPRES) 0.2 MG tablet TAKE 1 TABLET BY MOUTH AT BEDTIME (Patient not taking: Reported on 1/26/2022)       No current facility-administered medications for this visit. Review of Systems  Constitutional: No fever, no chills, no diaphoresis, no generalized weakness. HEENT: No blurred vision, No sore throat, no ear pain, no hair loss  Neck: denied any neck swelling, difficulty swallowing,   Cadrdiopulomary: No CP, SOB or palpitation, No orthopnea or PND. No cough or wheezing. GI: No N/V/D, no constipation, No abdominal pain, no melena or hematochezia   : Denied any dysuria, hematuria, flank pain, discharge, or incontinence. Skin: denied any rash, ulcer, Hirsute, or hyperpigmentation. MSK: denied any joint deformity, joint pain/swelling, muscle pain, or back pain. Neuro: no numbess, no tingling, no weakness    OBJECTIVE    /83   Pulse 95   Ht 5' 3\" (1.6 m)   Wt 149 lb (67.6 kg)   SpO2 95%   BMI 26.39 kg/m²   BP Readings from Last 4 Encounters:   01/26/22 122/83   11/16/21 (!) 80/56   08/02/21 110/62   06/25/19 110/60     Wt Readings from Last 6 Encounters:   01/26/22 149 lb (67.6 kg)   11/16/21 140 lb (63.5 kg)   08/02/21 140 lb 6.4 oz (63.7 kg)   06/25/19 120 lb (54.4 kg)   02/07/19 132 lb (59.9 kg)   09/11/17 102 lb (46.3 kg)        Physical examination:  General: awake alert, no abnormal position or movements. HEENT: normocephalic non traumatic. Neck: supple, no LN enlargement, no thyromegaly, no thyroid tenderness, no JVD. No Acanthosis nigricans, no skin tags   Pulm: clear equal air entry no added sounds,  CVS: S1 + S2, no murmur, no heave. Abd: soft lax no tenderness, no organomegaly, audible bowel sounds.    Skin: warm, no lesions, no rash, no acanthosis nigricans, no skin tags   Musculoskeletal: No back tenderness, no kyphosis/scoliosis   Neuro: CN intact, sensation notmal , muscle power normal, no abnormal movement or abnormal position noted   Psych: normal mood, and affect    Review of Laboratory Data:  I personally  reviewed the following labs:  Lab Results   Component Value Date/Time    WBC 8.0 02/07/2019 12:32 PM    RBC 4.84 02/07/2019 12:32 PM    HGB 13.7 02/07/2019 12:32 PM    HCT 40.6 02/07/2019 12:32 PM    MCV 83.9 02/07/2019 12:32 PM    MCH 28.3 02/07/2019 12:32 PM    MCHC 33.7 02/07/2019 12:32 PM    RDW 14.8 02/07/2019 12:32 PM     02/07/2019 12:32 PM    MPV 11.8 02/07/2019 12:32 PM      Lab Results   Component Value Date/Time     02/07/2019 12:32 PM    K 4.2 02/07/2019 12:32 PM    CO2 23 02/07/2019 12:32 PM    BUN 12 02/07/2019 12:32 PM    CALCIUM 9.1 02/07/2019 12:32 PM      Lab Results   Component Value Date/Time    TSH 3.280 08/05/2017 09:17 AM    P0OSQLK 9.4 08/05/2017 09:17 AM     No components found for: PTHINTACT  No results found for: VITD25  No results found for: TESTOSTERONE  No results found for: FTES  No results found for: SHBG  No results found for: [facility]  No results found for: LH  No results found for: 17OHPROG  No results found for: DHEAS  No results found for: CORTISOL  No results found for: ACTH  No results found for: PROLACTIN  Lab Results   Component Value Date    TSH 3.280 08/05/2017    TSH 2.730 07/28/2017    TSH 1.145 02/02/2012     No results found for: FREET4  No results found for: LABCORT  No results found for: CORTUFRCRT  No results found for: CORTFRUTIME  No results found for: [id number]  No results found for: [address]   [address], a 36 y.o.-old female  seen in the clinic for following issues      Diagnosis Orders   1. Palpitation  TSH without Reflex    T4, Free    Metanephrines Plasma Free   2. Sweating profusely  TSH without Reflex    T4, Free   3.  Oligomenorrhea, unspecified type       Oligomenorrhea   · Pt currently on OCP and because of this we can't check her female hormons  · Pt would like to say on OCP at this time     Sweating   · Check TFT   · With h/o palpitations, will also check plasma metanephrine     I personally reviewed external notes from PCP and other patient's care team providers, and personally interpreted labs associated with the above diagnosis. I also ordered labs to further assess and manage the above addressed medical conditions. Return in about 4 months (around 5/26/2022) for profuse sweating . The above issues were reviewed with the patient who understood and agreed with the plan. Thank you for allowing us to participate in the care of this patient. Please do not hesitate to contact us with any additional questions. Fede Genao MD  Endocrinologist, [facility] Diabetes Care and Endocrinology   [address] [place] [address]   Phone: [phone]  Fax: [phone]  -------------------------  An electronic signature was used to authenticate this note.  Jourdan Whitney MD on 1/26/2022 at 2:12 PM

## 2022-01-30 ENCOUNTER — TELEPHONE (OUTPATIENT)
Dept: ENDOCRINOLOGY | Age: 32
End: 2022-01-30

## 2022-02-01 LAB
METANEPH/PLASMA INTERP: NORMAL
METANEPHRINE FREE PLASMA: 0.15 NMOL/L (ref 0–0.49)
NORMETANEPHRINE FREE PLASMA: 0.81 NMOL/L (ref 0–0.89)

## 2022-02-26 DIAGNOSIS — R00.0 TACHYCARDIA: ICD-10-CM

## 2022-02-28 RX ORDER — METOPROLOL SUCCINATE 25 MG/1
TABLET, EXTENDED RELEASE ORAL
Qty: 30 TABLET | Refills: 2 | Status: SHIPPED | OUTPATIENT
Start: 2022-02-28

## 2022-08-12 ENCOUNTER — TELEPHONE (OUTPATIENT)
Dept: ADMINISTRATIVE | Age: 32
End: 2022-08-12

## 2022-08-12 NOTE — TELEPHONE ENCOUNTER
Patient has referral for Dr. Rut Hadley from Dr. Erendira Kate. States it was sent to office on 8/9. Please advise for scheduling.

## 2022-08-16 ENCOUNTER — TELEPHONE (OUTPATIENT)
Dept: ENDOCRINOLOGY | Age: 32
End: 2022-08-16

## 2022-08-16 ENCOUNTER — OFFICE VISIT (OUTPATIENT)
Dept: ENDOCRINOLOGY | Age: 32
End: 2022-08-16
Payer: MEDICAID

## 2022-08-16 VITALS
HEART RATE: 100 BPM | OXYGEN SATURATION: 95 % | DIASTOLIC BLOOD PRESSURE: 94 MMHG | WEIGHT: 152 LBS | HEIGHT: 63 IN | BODY MASS INDEX: 26.93 KG/M2 | SYSTOLIC BLOOD PRESSURE: 126 MMHG

## 2022-08-16 DIAGNOSIS — N91.5 OLIGOMENORRHEA, UNSPECIFIED TYPE: Primary | ICD-10-CM

## 2022-08-16 DIAGNOSIS — R00.2 PALPITATION: ICD-10-CM

## 2022-08-16 DIAGNOSIS — R61 SWEATING PROFUSELY: ICD-10-CM

## 2022-08-16 PROCEDURE — G8419 CALC BMI OUT NRM PARAM NOF/U: HCPCS | Performed by: CLINICAL NURSE SPECIALIST

## 2022-08-16 PROCEDURE — 99214 OFFICE O/P EST MOD 30 MIN: CPT | Performed by: CLINICAL NURSE SPECIALIST

## 2022-08-16 PROCEDURE — G8428 CUR MEDS NOT DOCUMENT: HCPCS | Performed by: CLINICAL NURSE SPECIALIST

## 2022-08-16 PROCEDURE — 1036F TOBACCO NON-USER: CPT | Performed by: CLINICAL NURSE SPECIALIST

## 2022-08-16 NOTE — PROGRESS NOTES
[facility] [facility] [place] Department of Endocrinology Diabetes and Metabolism   [address] [facility] [address]   Phone: [phone]  Fax: [phone]    Date of Service: 8/16/2022  Primary Care Physician: Pamela Knight MD  Referring physician: No ref. provider found  Provider: Xiang Rios MD     Reason for the visit:  Hot flashes     History of Present Illness:  History was taken from the patient who is a very good historian. Jayda Crocker is a very pleasant 29 y.o. female seen today for evaluation and Management of irregular cycle and hot flushes     Menstrual cycle started at age 8 and has been irregular from the beginning. About 4-5 months  ago she started c/o profuse sweating and hot flushes    She is currently on OCP and not planning for pregnancy in the near future. She was sweating episodes 2-3 times every day (day and night) and last 5-15 minutes. The patient denied any h/o headache during the episodes, wheezing or bronchospasm. No history of skin rash or skin tags. No history of palpitation or tremors and no neck pain or swelling. No history of diarrhea. She didn't check her BP during the episodes. No changes in shoe/hat size.  No h/o cough or SOB   Was started Glycopyrronium by PCP last week and this helps considerably and no longer sweating while on medication     Lab Results   Component Value Date    TSH 3.820 01/26/2022    G7CNJHP 9.4 08/05/2017    T4FREE 1.35 01/26/2022     Plamsal metanephrines WNL (1/2022)      PAST MEDICAL HISTORY   Past Medical History:   Diagnosis Date    Anxiety     hospitalized [facility]    Arm pain     8 to 9/10 severity, radiates to hands    Autism     Back pain     8 to 9/10 severity    Depression     hospitalized [facility]    Fibromyalgia     GERD (gastroesophageal reflux disease)     Headache(784.0)     7/10 severity, few times a week    Irritable bowel syndrome     Leg pain     8 to 9/10 severity, radiates to feet    Migraines 10/10 severity, 2-3 per month    MS (multiple sclerosis) (HCC)     Muscle pain     Muscle weakness     with loss of tone    Numbness and tingling     back, legs, arms       PAST SURGICAL HISTORY   Past Surgical History:   Procedure Laterality Date    HERNIA REPAIR      Pt was 5 yrs old    OTHER SURGICAL HISTORY  11/29/2021    Tilt Table Test    VAGINA SURGERY  01/01/2007    Abscess,  Dr. Anayeli Galvan   Tobacco:   reports that she has never smoked. She has never used smokeless tobacco.  Alcohol:   reports current alcohol use. Drugs:   reports no history of drug use.     FAMILY HISTORY   Family History   Problem Relation Age of Onset    Diabetes Maternal Grandmother     Hypertension Maternal Grandmother     Other Maternal Grandmother         lymphoma    Asthma Paternal Grandfather     Hypertension Paternal Grandfather     Hypertension Father     Hypertension Paternal Grandmother        ALLERGIES AND DRUG REACTIONS   Allergies   Allergen Reactions    Codeine Itching    Tape Yasmin Constant Tape] Itching    Morphine Itching       CURRENT MEDICATIONS   Current Outpatient Medications   Medication Sig Dispense Refill    metoprolol succinate (TOPROL XL) 25 MG extended release tablet TAKE 1 TABLET BY MOUTH DAILY 30 tablet 2    omeprazole (PRILOSEC) 40 MG delayed release capsule TAKE 1 CAPSULE BY MOUTH EVERY DAY      famotidine (PEPCID) 20 MG tablet Take 20 mg by mouth 2 times daily      buPROPion (WELLBUTRIN XL) 150 MG extended release tablet TAKE 1 TABLET BY MOUTH EVERY DAY      escitalopram (LEXAPRO) 20 MG tablet TAKE 1 TABLET BY MOUTH EVERY DAY      SAVELLA 100 MG TABS 100 mg every evening       milnacipran HCl (SAVELLA) 50 MG TABS Take by mouth every morning (before breakfast)       cloNIDine (CATAPRES) 0.2 MG tablet TAKE 1 TABLET BY MOUTH AT BEDTIME (Patient not taking: Reported on 1/26/2022)      fludrocortisone (FLORINEF) 0.1 MG tablet TAKE 1 TABLET BY MOUTH EVERY MORNING      Cholecalciferol (VITAMIN D3) 50 MCG (2000 UT) CAPS TAKE 1 CAPSULE BY MOUTH EVERY DAY      oxyCODONE-acetaminophen (PERCOCET) 5-325 MG per tablet take 1 tablet by mouth every 8 hours if needed for pain      norethindrone-ethinyl estradiol (JINTELI) 1-5 MG-MCG TABS per tablet Take by mouth      Dimethyl Fumarate (TECFIDERA) 240 MG CPDR Take by mouth 2 times daily        No current facility-administered medications for this visit. Review of Systems  Constitutional: No fever, no chills, no diaphoresis, no generalized weakness. HEENT: No blurred vision, No sore throat, no ear pain, no hair loss  Neck: denied any neck swelling, difficulty swallowing,   Cadrdiopulomary: No CP, SOB or palpitation, No orthopnea or PND. No cough or wheezing. GI: No N/V/D, no constipation, No abdominal pain, no melena or hematochezia   : Denied any dysuria, hematuria, flank pain, discharge, or incontinence. Skin: denied any rash, ulcer, Hirsute, or hyperpigmentation. MSK: denied any joint deformity, joint pain/swelling, muscle pain, or back pain. Neuro: no numbess, no tingling, no weakness    OBJECTIVE    BP (!) 126/94   Pulse 100   Ht 5' 3\" (1.6 m)   Wt 152 lb (68.9 kg)   SpO2 95%   BMI 26.93 kg/m²   BP Readings from Last 4 Encounters:   08/16/22 (!) 126/94   01/26/22 122/83   11/16/21 (!) 80/56   08/02/21 110/62     Wt Readings from Last 6 Encounters:   08/16/22 152 lb (68.9 kg)   01/26/22 149 lb (67.6 kg)   11/16/21 140 lb (63.5 kg)   08/02/21 140 lb 6.4 oz (63.7 kg)   06/25/19 120 lb (54.4 kg)   02/07/19 132 lb (59.9 kg)        Physical examination:  General: awake alert, no abnormal position or movements. HEENT: normocephalic non traumatic. Neck: supple, no LN enlargement, no thyromegaly, no thyroid tenderness, no JVD. No Acanthosis nigricans, no skin tags   Pulm: clear equal air entry no added sounds,  CVS: S1 + S2, no murmur, no heave. Abd: soft lax no tenderness, no organomegaly, audible bowel sounds.    Skin: warm, no lesions, no rash, no acanthosis nigricans, no skin tags   Musculoskeletal: No back tenderness, no kyphosis/scoliosis   Neuro: CN intact, sensation notmal , muscle power normal, no abnormal movement or abnormal position noted   Psych: normal mood, and affect    Review of Laboratory Data:  I personally  reviewed the following labs:  Lab Results   Component Value Date/Time    WBC 8.0 02/07/2019 12:32 PM    RBC 4.84 02/07/2019 12:32 PM    HGB 13.7 02/07/2019 12:32 PM    HCT 40.6 02/07/2019 12:32 PM    MCV 83.9 02/07/2019 12:32 PM    MCH 28.3 02/07/2019 12:32 PM    MCHC 33.7 02/07/2019 12:32 PM    RDW 14.8 02/07/2019 12:32 PM     02/07/2019 12:32 PM    MPV 11.8 02/07/2019 12:32 PM      Lab Results   Component Value Date/Time     02/07/2019 12:32 PM    K 4.2 02/07/2019 12:32 PM    CO2 23 02/07/2019 12:32 PM    BUN 12 02/07/2019 12:32 PM    CALCIUM 9.1 02/07/2019 12:32 PM      Lab Results   Component Value Date/Time    TSH 3.820 01/26/2022 02:31 PM    T4FREE 1.35 01/26/2022 02:31 PM    B0HPZOL 9.4 08/05/2017 09:17 AM     No components found for: PTHINTACT  No results found for: VITD25  No results found for: TESTOSTERONE  No results found for: FTES  No results found for: SHBG  No results found for: [facility]  No results found for: LH  No results found for: 17OHPROG  No results found for: DHEAS  No results found for: CORTISOL  No results found for: ACTH  No results found for: PROLACTIN  Lab Results   Component Value Date/Time    TSH 3.820 01/26/2022 02:31 PM    TSH 3.280 08/05/2017 09:17 AM    TSH 2.730 07/28/2017 08:35 AM    TSH 1.145 02/02/2012 12:53 PM     No results found for: FREET4  No results found for: LABCORT  No results found for: CORTUFRCRT  No results found for: CORTFRUTIME  No results found for: [id number]  No results found for: [address]   [address], a 29 y.o.-old female  seen in the clinic for following issues      Diagnosis Orders   1. Oligomenorrhea, unspecified type        2. Sweating profusely        3. Palpitation            Oligomenorrhea   Pt currently on OCP and because of this we can't check her female hormones  Pt would like to say on OCP at this time     Sweating   TFT's WNL   plasma metanephrine WNL   No endocrine cause  Patient was started on Glycopyrronium by PCP and sweating resolved    I personally spent > 30 minutes reviewing external notes from PCP and other patient's care team providers, and personally interpreted labs associated with the above diagnosis. I also ordered labs to further assess and manage the above addressed medical conditions. Return in about 6 months (around 2/16/2023). The above issues were reviewed with the patient who understood and agreed with the plan. Thank you for allowing us to participate in the care of this patient. Please do not hesitate to contact us with any additional questions. MULU Shepherd [facility] - BEHAVIORAL HEALTH SERVICES Diabetes Care and Endocrinology   [address] [facility] [address]   Phone: [phone]  Fax: [phone]  -------------------------  An electronic signature was used to authenticate this note.  MULU Shepherd   on 8/16/2022 at 9:48 AM

## 2022-08-22 ENCOUNTER — TELEPHONE (OUTPATIENT)
Dept: ADMINISTRATIVE | Age: 32
End: 2022-08-22

## 2022-09-29 ENCOUNTER — OFFICE VISIT (OUTPATIENT)
Dept: RHEUMATOLOGY | Age: 32
End: 2022-09-29
Payer: MEDICAID

## 2022-09-29 VITALS
RESPIRATION RATE: 16 BRPM | OXYGEN SATURATION: 98 % | HEIGHT: 63 IN | BODY MASS INDEX: 26.4 KG/M2 | SYSTOLIC BLOOD PRESSURE: 110 MMHG | DIASTOLIC BLOOD PRESSURE: 74 MMHG | WEIGHT: 149 LBS | HEART RATE: 102 BPM

## 2022-09-29 DIAGNOSIS — M79.7 FIBROMYALGIA: ICD-10-CM

## 2022-09-29 DIAGNOSIS — G35 MULTIPLE SCLEROSIS (HCC): Chronic | ICD-10-CM

## 2022-09-29 DIAGNOSIS — K21.9 GERD WITHOUT ESOPHAGITIS: ICD-10-CM

## 2022-09-29 DIAGNOSIS — M35.3 POLYMYALGIA (HCC): ICD-10-CM

## 2022-09-29 DIAGNOSIS — M13.0 POLYARTHRITIS: Primary | ICD-10-CM

## 2022-09-29 DIAGNOSIS — M35.7 BENIGN HYPERMOBILITY SYNDROME: ICD-10-CM

## 2022-09-29 DIAGNOSIS — M13.0 POLYARTHRITIS: ICD-10-CM

## 2022-09-29 LAB — SEDIMENTATION RATE, ERYTHROCYTE: 2 MM/HR (ref 0–20)

## 2022-09-29 PROCEDURE — 99204 OFFICE O/P NEW MOD 45 MIN: CPT | Performed by: INTERNAL MEDICINE

## 2022-09-29 PROCEDURE — 1036F TOBACCO NON-USER: CPT | Performed by: INTERNAL MEDICINE

## 2022-09-29 PROCEDURE — G8419 CALC BMI OUT NRM PARAM NOF/U: HCPCS | Performed by: INTERNAL MEDICINE

## 2022-09-29 PROCEDURE — G8427 DOCREV CUR MEDS BY ELIG CLIN: HCPCS | Performed by: INTERNAL MEDICINE

## 2022-09-29 ASSESSMENT — ENCOUNTER SYMPTOMS
TROUBLE SWALLOWING: 0
VOMITING: 0
NAUSEA: 0
DIARRHEA: 0
SHORTNESS OF BREATH: 0
COLOR CHANGE: 0
ABDOMINAL PAIN: 0
COUGH: 0

## 2022-09-29 NOTE — PROGRESS NOTES
Rheumatoid Factor; Future  -     Cyclic Citrul Peptide Antibody, IgG; Future  3. Fibromyalgia  -     VIDHI; Future  -     C-Reactive Protein; Future  -     Sedimentation Rate; Future  -     Rheumatoid Factor; Future  -     Cyclic Citrul Peptide Antibody, IgG; Future  4. Benign hypermobility syndrome  -     VIDHI; Future  -     C-Reactive Protein; Future  -     Sedimentation Rate; Future  -     Rheumatoid Factor; Future  -     Cyclic Citrul Peptide Antibody, IgG; Future  5. Multiple sclerosis (HCC)  -     VIDHI; Future  -     C-Reactive Protein; Future  -     Sedimentation Rate; Future  -     Rheumatoid Factor; Future  -     Cyclic Citrul Peptide Antibody, IgG; Future  6. GERD without esophagitis      Return if symptoms worsen or fail to improve. Subjective   SUBJECTIVE/OBJECTIVE:    HPI: Hannah Rizo 1990 is a 29 y.o. female seen in consult for polyarthritis and polymyalgia. Patient is here for second opinion regarding fibromyalgia, hypermobility syndrome, possible connective tissue disease. She was previously seeing rheumatology at the [facility] clinic who diagnosed her with fibromyalgia and hypermobility syndrome. Prior work-up for underlying systemic connective tissue disease has been unrevealing. Of note however she does have MS and is on Tecfidera. She follows with neurology. She complains of diffuse pain. She states that her joints \"do not stay in place. \"  She states that her joints crack a lot. She does not necessarily dislocate them. She is very flexible. The joints that bother her the most are the hips, wrists, knees. She does not really get joint swelling. Her pain is worse with weather changes. This has been going on for years. As far as fibromyalgia she has failed or been intolerant to gabapentin, Lyrica, Cymbalta. She is on Savella which does help. She does take Tylenol. She apparently has pretty significant GERD so GI does not want her taking NSAIDs.   She does use Voltaren gel which helps. Denies fever, rash, oral ulcers, nasal ulcers, alopecia, lymphadenopathy, chest pain, Raynaud's, dysuria, hematuria, history of DVT. Past Medical History:   Diagnosis Date    Anxiety     hospital[facility]    Arm pain     8 to 9/10 severity, radiates to hands    Autism     Back pain     8 to 9/10 severity    Depression     hospitalized [facility]    Fibromyalgia     GERD (gastroesophageal reflux disease)     Headache(784.0)     7/10 severity, few times a week    Irritable bowel syndrome     Leg pain     8 to 9/10 severity, radiates to feet    Migraines     10/10 severity, 2-3 per month    MS (multiple sclerosis) (HCC)     Muscle pain     Muscle weakness     with loss of tone    Numbness and tingling     back, legs, arms        Review of Systems   Constitutional:  Negative for fatigue and fever. HENT:  Negative for mouth sores and trouble swallowing. Respiratory:  Negative for cough and shortness of breath. Cardiovascular:  Negative for chest pain. Gastrointestinal:  Negative for abdominal pain, diarrhea, nausea and vomiting. Genitourinary:  Negative for dysuria and hematuria. Musculoskeletal:  Positive for arthralgias and myalgias. Negative for joint swelling. Skin:  Negative for color change and rash. Neurological:  Negative for weakness and numbness. Hematological:  Negative for adenopathy. All other systems reviewed and are negative. Objective   Vitals:    09/29/22 1543   BP: 110/74   Pulse: (!) 102   Resp: 16   SpO2: 98%      Physical Exam  Constitutional:       General: She is not in acute distress. Appearance: Normal appearance. HENT:      Head: Normocephalic and atraumatic. Right Ear: External ear normal.      Left Ear: External ear normal.      Nose: Nose normal.   Eyes:      General: No scleral icterus. Pulmonary:      Effort: Pulmonary effort is normal.   Musculoskeletal:         General: Tenderness present.  No swelling or deformity. Comments: She has diffuse allodynia but no evidence of synovitis on exam.  Meets 7 out of 9 Beighton criteria. Skin:     General: Skin is warm and dry. Findings: No rash. Neurological:      General: No focal deficit present. Mental Status: She is alert and oriented to person, place, and time. Mental status is at baseline. Psychiatric:         Mood and Affect: Mood normal.         Behavior: Behavior normal.          Lab Results   Component Value Date    WBC 8.0 02/07/2019    HGB 13.7 02/07/2019    HCT 40.6 02/07/2019    MCV 83.9 02/07/2019     02/07/2019     Lab Results   Component Value Date     02/07/2019    K 4.2 02/07/2019     02/07/2019    CO2 23 02/07/2019    BUN 12 02/07/2019    CREATININE 0.7 02/07/2019    GLUCOSE 92 02/07/2019    CALCIUM 9.1 02/07/2019    PROT 7.1 02/07/2019    LABALBU 4.0 02/07/2019    BILITOT 0.4 02/07/2019    ALKPHOS 52 02/07/2019    AST 13 02/07/2019    ALT 9 02/07/2019    LABGLOM >60 02/07/2019    GFRAA >60 02/07/2019     No results found for: VIDHI  No results found for: RHEUMFACTOR  Lab Results   Component Value Date    SEDRATE 2 02/07/2019     Lab Results   Component Value Date    CRP 0.3 02/07/2019            An electronic signature was used to authenticate this note. This note was generated with a voice recognition dictation system. Please disregard any errors or omission which have escaped my review.     --Kassie Morris, DO

## 2022-09-29 NOTE — PATIENT INSTRUCTIONS
You have benign hypermobility syndrome and fibromyalgia either way. The question is if there is also an underlying systemic connective tissue disease.  I see no striking features but will need further workup

## 2022-09-30 LAB
ANTI-NUCLEAR ANTIBODY (ANA): NEGATIVE
C-REACTIVE PROTEIN: 0.7 MG/DL (ref 0–0.4)
RHEUMATOID FACTOR: <10 IU/ML (ref 0–13)

## 2022-10-01 LAB — CYCLIC CITRULLINATED PEPTIDE ANTIBODY IGG: 0.8 U/ML (ref 0–7)

## 2023-06-05 ENCOUNTER — HOSPITAL ENCOUNTER (EMERGENCY)
Age: 33
Discharge: HOME | End: 2023-06-05
Payer: MEDICAID

## 2023-06-05 VITALS
WEIGHT: 143 LBS | HEART RATE: 95 BPM | DIASTOLIC BLOOD PRESSURE: 84 MMHG | OXYGEN SATURATION: 96 % | TEMPERATURE: 98.7 F | BODY MASS INDEX: 25.33 KG/M2 | RESPIRATION RATE: 18 BRPM | SYSTOLIC BLOOD PRESSURE: 123 MMHG

## 2023-06-05 DIAGNOSIS — E87.6 HYPOKALEMIA: ICD-10-CM

## 2023-06-05 DIAGNOSIS — N30.01 ACUTE CYSTITIS WITH HEMATURIA: ICD-10-CM

## 2023-06-05 DIAGNOSIS — E86.0 DEHYDRATION: Primary | ICD-10-CM

## 2023-06-05 LAB
ALBUMIN SERPL-MCNC: 4.5 G/DL (ref 3.5–5.2)
ALP SERPL-CCNC: 55 U/L (ref 35–104)
ALT SERPL-CCNC: 9 U/L (ref 0–32)
ANION GAP SERPL CALCULATED.3IONS-SCNC: 13 MMOL/L (ref 7–16)
AST SERPL-CCNC: 13 U/L (ref 0–31)
BACTERIA URNS QL MICRO: ABNORMAL /HPF
BASOPHILS # BLD: 0.03 E9/L (ref 0–0.2)
BASOPHILS NFR BLD: 0.4 % (ref 0–2)
BILIRUB SERPL-MCNC: 0.4 MG/DL (ref 0–1.2)
BILIRUB UR QL STRIP: NEGATIVE
BUN SERPL-MCNC: 8 MG/DL (ref 6–20)
CALCIUM SERPL-MCNC: 9.2 MG/DL (ref 8.6–10.2)
CHLORIDE SERPL-SCNC: 101 MMOL/L (ref 98–107)
CLARITY UR: ABNORMAL
CO2 SERPL-SCNC: 25 MMOL/L (ref 22–29)
COLOR UR: YELLOW
CREAT SERPL-MCNC: 0.7 MG/DL (ref 0.5–1)
EOSINOPHIL # BLD: 0.09 E9/L (ref 0.05–0.5)
EOSINOPHIL NFR BLD: 1.1 % (ref 0–6)
EPI CELLS #/AREA URNS HPF: ABNORMAL /HPF
ERYTHROCYTE [DISTWIDTH] IN BLOOD BY AUTOMATED COUNT: 14.6 FL (ref 11.5–15)
GLUCOSE SERPL-MCNC: 112 MG/DL (ref 74–99)
GLUCOSE UR STRIP-MCNC: NEGATIVE MG/DL
HCG UR QL: NEGATIVE
HCT VFR BLD AUTO: 41.6 % (ref 34–48)
HGB BLD-MCNC: 14 G/DL (ref 11.5–15.5)
HGB UR QL STRIP: ABNORMAL
IMM GRANULOCYTES # BLD: 0.03 E9/L
IMM GRANULOCYTES NFR BLD: 0.4 % (ref 0–5)
KETONES UR STRIP-MCNC: NEGATIVE MG/DL
LEUKOCYTE ESTERASE UR QL STRIP: ABNORMAL
LYMPHOCYTES # BLD: 3.35 E9/L (ref 1.5–4)
LYMPHOCYTES NFR BLD: 39.3 % (ref 20–42)
MAGNESIUM SERPL-MCNC: 2.1 MG/DL (ref 1.6–2.6)
MCH RBC QN AUTO: 28.2 PG (ref 26–35)
MCHC RBC AUTO-ENTMCNC: 33.7 % (ref 32–34.5)
MCV RBC AUTO: 83.7 FL (ref 80–99.9)
MONOCYTES # BLD: 0.44 E9/L (ref 0.1–0.95)
MONOCYTES NFR BLD: 5.2 % (ref 2–12)
NEUTROPHILS # BLD: 4.58 E9/L (ref 1.8–7.3)
NEUTS SEG NFR BLD: 53.6 % (ref 43–80)
NITRITE UR QL STRIP: NEGATIVE
PH UR STRIP: 5.5 [PH] (ref 5–9)
PLATELET # BLD AUTO: 229 E9/L (ref 130–450)
PMV BLD AUTO: 11.5 FL (ref 7–12)
POTASSIUM SERPL-SCNC: 3.3 MMOL/L (ref 3.5–5)
PROT SERPL-MCNC: 7.5 G/DL (ref 6.4–8.3)
PROT UR STRIP-MCNC: NEGATIVE MG/DL
RBC # BLD AUTO: 4.97 E12/L (ref 3.5–5.5)
RBC #/AREA URNS HPF: ABNORMAL /HPF (ref 0–2)
SODIUM SERPL-SCNC: 139 MMOL/L (ref 132–146)
SP GR UR STRIP: 1.01 (ref 1–1.03)
UROBILINOGEN UR STRIP-ACNC: 1 E.U./DL
WBC # BLD: 8.5 E9/L (ref 4.5–11.5)
WBC #/AREA URNS HPF: ABNORMAL /HPF (ref 0–5)

## 2023-06-05 PROCEDURE — 81025 URINE PREGNANCY TEST: CPT

## 2023-06-05 PROCEDURE — 6360000002 HC RX W HCPCS: Performed by: NURSE PRACTITIONER

## 2023-06-05 PROCEDURE — 85025 COMPLETE CBC W/AUTO DIFF WBC: CPT

## 2023-06-05 PROCEDURE — 2580000003 HC RX 258: Performed by: NURSE PRACTITIONER

## 2023-06-05 PROCEDURE — 81001 URINALYSIS AUTO W/SCOPE: CPT

## 2023-06-05 PROCEDURE — 96361 HYDRATE IV INFUSION ADD-ON: CPT

## 2023-06-05 PROCEDURE — 96374 THER/PROPH/DIAG INJ IV PUSH: CPT

## 2023-06-05 PROCEDURE — 83735 ASSAY OF MAGNESIUM: CPT

## 2023-06-05 PROCEDURE — 87088 URINE BACTERIA CULTURE: CPT

## 2023-06-05 PROCEDURE — 36415 COLL VENOUS BLD VENIPUNCTURE: CPT

## 2023-06-05 PROCEDURE — 99284 EMERGENCY DEPT VISIT MOD MDM: CPT

## 2023-06-05 PROCEDURE — 6370000000 HC RX 637 (ALT 250 FOR IP): Performed by: NURSE PRACTITIONER

## 2023-06-05 PROCEDURE — 80053 COMPREHEN METABOLIC PANEL: CPT

## 2023-06-05 RX ORDER — CEFDINIR 300 MG/1
300 CAPSULE ORAL 2 TIMES DAILY
Qty: 14 CAPSULE | Refills: 0 | Status: SHIPPED | OUTPATIENT
Start: 2023-06-05 | End: 2023-06-12

## 2023-06-05 RX ORDER — POTASSIUM CHLORIDE 20 MEQ/1
40 TABLET, EXTENDED RELEASE ORAL ONCE
Status: COMPLETED | OUTPATIENT
Start: 2023-06-05 | End: 2023-06-05

## 2023-06-05 RX ORDER — BUSPIRONE HYDROCHLORIDE 5 MG/1
5 TABLET ORAL 3 TIMES DAILY
COMMUNITY

## 2023-06-05 RX ORDER — POTASSIUM CHLORIDE 7.45 MG/ML
10 INJECTION INTRAVENOUS ONCE
Status: COMPLETED | OUTPATIENT
Start: 2023-06-05 | End: 2023-06-05

## 2023-06-05 RX ORDER — 0.9 % SODIUM CHLORIDE 0.9 %
1000 INTRAVENOUS SOLUTION INTRAVENOUS ONCE
Status: COMPLETED | OUTPATIENT
Start: 2023-06-05 | End: 2023-06-05

## 2023-06-05 RX ADMIN — POTASSIUM CHLORIDE 10 MEQ: 7.46 INJECTION, SOLUTION INTRAVENOUS at 21:01

## 2023-06-05 RX ADMIN — CEFTRIAXONE SODIUM 1000 MG: 1 INJECTION, POWDER, FOR SOLUTION INTRAMUSCULAR; INTRAVENOUS at 22:16

## 2023-06-05 RX ADMIN — POTASSIUM CHLORIDE 40 MEQ: 1500 TABLET, EXTENDED RELEASE ORAL at 20:56

## 2023-06-05 RX ADMIN — SODIUM CHLORIDE 1000 ML: 9 INJECTION, SOLUTION INTRAVENOUS at 20:05

## 2023-06-05 ASSESSMENT — PAIN DESCRIPTION - DESCRIPTORS: DESCRIPTORS: ACHING;PRESSURE;DISCOMFORT

## 2023-06-05 ASSESSMENT — PAIN SCALES - GENERAL: PAINLEVEL_OUTOF10: 7

## 2023-06-05 ASSESSMENT — PAIN - FUNCTIONAL ASSESSMENT: PAIN_FUNCTIONAL_ASSESSMENT: 0-10

## 2023-06-05 ASSESSMENT — PAIN DESCRIPTION - LOCATION: LOCATION: HEAD

## 2023-06-06 NOTE — DISCHARGE INSTRUCTIONS
Your urine appears to be infected, this is likely a part of the dizziness. Your potassium was also low, this can contribute to some dizziness as well. I also suspect you may be at somewhat dehydrated. Your labs otherwise were unremarkable.

## 2023-06-06 NOTE — ED PROVIDER NOTES
ensure accuracy; however, inadvertent computerized transcription errors may be present.   END OF PROVIDER NOTE      MULU Hawkins - LEONORA  06/06/23 7836

## 2023-06-07 ENCOUNTER — APPOINTMENT (OUTPATIENT)
Dept: GENERAL RADIOLOGY | Age: 33
End: 2023-06-07
Attending: STUDENT IN AN ORGANIZED HEALTH CARE EDUCATION/TRAINING PROGRAM
Payer: MEDICAID

## 2023-06-07 ENCOUNTER — APPOINTMENT (OUTPATIENT)
Dept: CT IMAGING | Age: 33
End: 2023-06-07
Payer: MEDICAID

## 2023-06-07 ENCOUNTER — HOSPITAL ENCOUNTER (EMERGENCY)
Age: 33
Discharge: HOME | End: 2023-06-07
Attending: STUDENT IN AN ORGANIZED HEALTH CARE EDUCATION/TRAINING PROGRAM
Payer: MEDICAID

## 2023-06-07 VITALS
SYSTOLIC BLOOD PRESSURE: 110 MMHG | DIASTOLIC BLOOD PRESSURE: 88 MMHG | HEIGHT: 63 IN | OXYGEN SATURATION: 99 % | TEMPERATURE: 98.3 F | HEART RATE: 108 BPM | RESPIRATION RATE: 16 BRPM | BODY MASS INDEX: 25.34 KG/M2 | WEIGHT: 143 LBS

## 2023-06-07 DIAGNOSIS — K59.00 CONSTIPATION, UNSPECIFIED CONSTIPATION TYPE: ICD-10-CM

## 2023-06-07 DIAGNOSIS — N30.01 ACUTE CYSTITIS WITH HEMATURIA: Primary | ICD-10-CM

## 2023-06-07 DIAGNOSIS — G44.209 TENSION HEADACHE: ICD-10-CM

## 2023-06-07 DIAGNOSIS — Z87.39 HISTORY OF FIBROMYALGIA: ICD-10-CM

## 2023-06-07 LAB
ALBUMIN SERPL-MCNC: 4.3 G/DL (ref 3.5–5.2)
ALP SERPL-CCNC: 51 U/L (ref 35–104)
ALT SERPL-CCNC: 7 U/L (ref 0–32)
ANION GAP SERPL CALCULATED.3IONS-SCNC: 13 MMOL/L (ref 7–16)
AST SERPL-CCNC: 12 U/L (ref 0–31)
BACTERIA UR CULT: NORMAL
BACTERIA URNS QL MICRO: ABNORMAL /HPF
BASOPHILS # BLD: 0.03 E9/L (ref 0–0.2)
BASOPHILS NFR BLD: 0.4 % (ref 0–2)
BILIRUB SERPL-MCNC: 0.4 MG/DL (ref 0–1.2)
BILIRUB UR QL STRIP: ABNORMAL
BUN SERPL-MCNC: 8 MG/DL (ref 6–20)
CALCIUM SERPL-MCNC: 9.1 MG/DL (ref 8.6–10.2)
CHLORIDE SERPL-SCNC: 100 MMOL/L (ref 98–107)
CLARITY UR: ABNORMAL
CO2 SERPL-SCNC: 23 MMOL/L (ref 22–29)
COLOR UR: YELLOW
CREAT SERPL-MCNC: 0.8 MG/DL (ref 0.5–1)
D DIMER: 294 NG/ML DDU
EOSINOPHIL # BLD: 0.05 E9/L (ref 0.05–0.5)
EOSINOPHIL NFR BLD: 0.7 % (ref 0–6)
EPI CELLS #/AREA URNS HPF: ABNORMAL /HPF
ERYTHROCYTE [DISTWIDTH] IN BLOOD BY AUTOMATED COUNT: 14.6 FL (ref 11.5–15)
GLUCOSE SERPL-MCNC: 84 MG/DL (ref 74–99)
GLUCOSE UR STRIP-MCNC: NEGATIVE MG/DL
HCT VFR BLD AUTO: 40.2 % (ref 34–48)
HGB BLD-MCNC: 13.7 G/DL (ref 11.5–15.5)
HGB UR QL STRIP: ABNORMAL
IMM GRANULOCYTES # BLD: 0.03 E9/L
IMM GRANULOCYTES NFR BLD: 0.4 % (ref 0–5)
KETONES UR STRIP-MCNC: 15 MG/DL
LEUKOCYTE ESTERASE UR QL STRIP: ABNORMAL
LIPASE: 20 U/L (ref 13–60)
LYMPHOCYTES # BLD: 2.19 E9/L (ref 1.5–4)
LYMPHOCYTES NFR BLD: 29.1 % (ref 20–42)
MAGNESIUM SERPL-MCNC: 1.8 MG/DL (ref 1.6–2.6)
MCH RBC QN AUTO: 28.3 PG (ref 26–35)
MCHC RBC AUTO-ENTMCNC: 34.1 % (ref 32–34.5)
MCV RBC AUTO: 83.1 FL (ref 80–99.9)
MONOCYTES # BLD: 0.34 E9/L (ref 0.1–0.95)
MONOCYTES NFR BLD: 4.5 % (ref 2–12)
NEUTROPHILS # BLD: 4.88 E9/L (ref 1.8–7.3)
NEUTS SEG NFR BLD: 64.9 % (ref 43–80)
NITRITE UR QL STRIP: NEGATIVE
PH UR STRIP: 5.5 [PH] (ref 5–9)
PLATELET # BLD AUTO: 229 E9/L (ref 130–450)
PMV BLD AUTO: 11.5 FL (ref 7–12)
POTASSIUM SERPL-SCNC: 3.7 MMOL/L (ref 3.5–5)
PROT SERPL-MCNC: 7.5 G/DL (ref 6.4–8.3)
PROT UR STRIP-MCNC: ABNORMAL MG/DL
RBC # BLD AUTO: 4.84 E12/L (ref 3.5–5.5)
RBC #/AREA URNS HPF: ABNORMAL /HPF (ref 0–2)
SODIUM SERPL-SCNC: 136 MMOL/L (ref 132–146)
SP GR UR STRIP: >=1.03 (ref 1–1.03)
TROPONIN, HIGH SENSITIVITY: <6 NG/L (ref 0–9)
UROBILINOGEN UR STRIP-ACNC: 0.2 E.U./DL
WBC # BLD: 7.5 E9/L (ref 4.5–11.5)
WBC #/AREA URNS HPF: ABNORMAL /HPF (ref 0–5)

## 2023-06-07 PROCEDURE — 87088 URINE BACTERIA CULTURE: CPT

## 2023-06-07 PROCEDURE — 81001 URINALYSIS AUTO W/SCOPE: CPT

## 2023-06-07 PROCEDURE — 70450 CT HEAD/BRAIN W/O DYE: CPT

## 2023-06-07 PROCEDURE — 36415 COLL VENOUS BLD VENIPUNCTURE: CPT

## 2023-06-07 PROCEDURE — 71045 X-RAY EXAM CHEST 1 VIEW: CPT

## 2023-06-07 PROCEDURE — 74177 CT ABD & PELVIS W/CONTRAST: CPT

## 2023-06-07 PROCEDURE — 2580000003 HC RX 258: Performed by: STUDENT IN AN ORGANIZED HEALTH CARE EDUCATION/TRAINING PROGRAM

## 2023-06-07 PROCEDURE — 83690 ASSAY OF LIPASE: CPT

## 2023-06-07 PROCEDURE — 6360000004 HC RX CONTRAST MEDICATION: Performed by: RADIOLOGY

## 2023-06-07 PROCEDURE — 6370000000 HC RX 637 (ALT 250 FOR IP): Performed by: STUDENT IN AN ORGANIZED HEALTH CARE EDUCATION/TRAINING PROGRAM

## 2023-06-07 PROCEDURE — 84484 ASSAY OF TROPONIN QUANT: CPT

## 2023-06-07 PROCEDURE — 80053 COMPREHEN METABOLIC PANEL: CPT

## 2023-06-07 PROCEDURE — 71275 CT ANGIOGRAPHY CHEST: CPT

## 2023-06-07 PROCEDURE — 83735 ASSAY OF MAGNESIUM: CPT

## 2023-06-07 PROCEDURE — 93005 ELECTROCARDIOGRAM TRACING: CPT | Performed by: STUDENT IN AN ORGANIZED HEALTH CARE EDUCATION/TRAINING PROGRAM

## 2023-06-07 PROCEDURE — 85378 FIBRIN DEGRADE SEMIQUANT: CPT

## 2023-06-07 PROCEDURE — 85025 COMPLETE CBC W/AUTO DIFF WBC: CPT

## 2023-06-07 PROCEDURE — 6360000002 HC RX W HCPCS: Performed by: STUDENT IN AN ORGANIZED HEALTH CARE EDUCATION/TRAINING PROGRAM

## 2023-06-07 RX ORDER — OXYCODONE HYDROCHLORIDE 5 MG/1
5 TABLET ORAL ONCE
Status: COMPLETED | OUTPATIENT
Start: 2023-06-07 | End: 2023-06-07

## 2023-06-07 RX ORDER — ONDANSETRON 4 MG/1
8 TABLET, ORALLY DISINTEGRATING ORAL 3 TIMES DAILY PRN
Qty: 14 TABLET | Refills: 0 | Status: SHIPPED | OUTPATIENT
Start: 2023-06-07

## 2023-06-07 RX ORDER — POLYETHYLENE GLYCOL 3350 17 G/17G
17 POWDER, FOR SOLUTION ORAL DAILY PRN
Qty: 30 EACH | Refills: 0 | Status: SHIPPED | OUTPATIENT
Start: 2023-06-07 | End: 2023-07-07

## 2023-06-07 RX ORDER — DIPHENHYDRAMINE HYDROCHLORIDE 50 MG/ML
25 INJECTION INTRAMUSCULAR; INTRAVENOUS ONCE
Status: COMPLETED | OUTPATIENT
Start: 2023-06-07 | End: 2023-06-07

## 2023-06-07 RX ORDER — ACETAMINOPHEN 500 MG
1000 TABLET ORAL ONCE
Status: COMPLETED | OUTPATIENT
Start: 2023-06-07 | End: 2023-06-07

## 2023-06-07 RX ORDER — NAPROXEN 500 MG/1
500 TABLET ORAL 2 TIMES DAILY WITH MEALS
Qty: 20 TABLET | Refills: 0 | Status: SHIPPED | OUTPATIENT
Start: 2023-06-07

## 2023-06-07 RX ORDER — PROCHLORPERAZINE EDISYLATE 5 MG/ML
10 INJECTION INTRAMUSCULAR; INTRAVENOUS ONCE
Status: COMPLETED | OUTPATIENT
Start: 2023-06-07 | End: 2023-06-07

## 2023-06-07 RX ORDER — 0.9 % SODIUM CHLORIDE 0.9 %
1000 INTRAVENOUS SOLUTION INTRAVENOUS ONCE
Status: COMPLETED | OUTPATIENT
Start: 2023-06-07 | End: 2023-06-07

## 2023-06-07 RX ADMIN — ACETAMINOPHEN 1000 MG: 500 TABLET ORAL at 14:22

## 2023-06-07 RX ADMIN — OXYCODONE 5 MG: 5 TABLET ORAL at 17:10

## 2023-06-07 RX ADMIN — CEFTRIAXONE SODIUM 1000 MG: 1 INJECTION, POWDER, FOR SOLUTION INTRAMUSCULAR; INTRAVENOUS at 18:08

## 2023-06-07 RX ADMIN — SODIUM CHLORIDE 1000 ML: 9 INJECTION, SOLUTION INTRAVENOUS at 18:08

## 2023-06-07 RX ADMIN — DIPHENHYDRAMINE HYDROCHLORIDE 25 MG: 50 INJECTION, SOLUTION INTRAMUSCULAR; INTRAVENOUS at 14:24

## 2023-06-07 RX ADMIN — PROCHLORPERAZINE EDISYLATE 10 MG: 5 INJECTION INTRAMUSCULAR; INTRAVENOUS at 14:23

## 2023-06-07 RX ADMIN — IOPAMIDOL 75 ML: 755 INJECTION, SOLUTION INTRAVENOUS at 18:47

## 2023-06-07 RX ADMIN — SODIUM CHLORIDE 1000 ML: 9 INJECTION, SOLUTION INTRAVENOUS at 14:21

## 2023-06-07 ASSESSMENT — ENCOUNTER SYMPTOMS
BACK PAIN: 1
SORE THROAT: 0
COUGH: 0
ABDOMINAL PAIN: 0
DIARRHEA: 0
SHORTNESS OF BREATH: 0
RHINORRHEA: 0
WHEEZING: 0
VOMITING: 0
NAUSEA: 0

## 2023-06-07 ASSESSMENT — PAIN DESCRIPTION - LOCATION
LOCATION: HEAD
LOCATION: HEAD
LOCATION: GENERALIZED
LOCATION: GENERALIZED
LOCATION_2: CHEST

## 2023-06-07 ASSESSMENT — PAIN SCALES - GENERAL
PAINLEVEL_OUTOF10: 4
PAINLEVEL_OUTOF10: 0
PAINLEVEL_OUTOF10: 10
PAINLEVEL_OUTOF10: 5
PAINLEVEL_OUTOF10: 8

## 2023-06-07 ASSESSMENT — PAIN DESCRIPTION - PAIN TYPE: TYPE: ACUTE PAIN

## 2023-06-07 ASSESSMENT — PAIN DESCRIPTION - ORIENTATION
ORIENTATION: POSTERIOR;RIGHT
ORIENTATION_2: MID

## 2023-06-07 ASSESSMENT — PAIN DESCRIPTION - DESCRIPTORS
DESCRIPTORS_2: ACHING;SQUEEZING;DISCOMFORT
DESCRIPTORS: DISCOMFORT;PRESSURE

## 2023-06-07 ASSESSMENT — PAIN DESCRIPTION - INTENSITY
RATING_2: 6
RATING_2: 0

## 2023-06-07 ASSESSMENT — PAIN - FUNCTIONAL ASSESSMENT: PAIN_FUNCTIONAL_ASSESSMENT: 0-10

## 2023-06-07 NOTE — ED NOTES
Patient states she has chest pain, headache, back of her neck hurts , nausea     Tanja Topete RN  06/07/23 5800

## 2023-06-08 NOTE — ED PROVIDER NOTES
ADDENDUM NOTE:  6:15 PM EDT  I received this patient at sign out from Dr. Shayla Simmons  I have discussed the patient's initial exam, treatment and plan of care with the out going physician. I have introduced my self to the patient / family and have answered their questions to this point. I have examined the patient myself and reviewed ordered tests / medications and  reviewed any available results to this point. See Dr. Ligia Julian notes RE: HPI /ROS/EKG interpretation which I did review.   Pt. was endorsed to me for review of the CT a pulmonary and CT head study which were pending.    -------------------------------------------------- RESULTS -------------------------------------------------  All laboratory and radiology results have been personally reviewed by myself   LABS:  Results for orders placed or performed during the hospital encounter of 06/07/23   CMP   Result Value Ref Range    Sodium 136 132 - 146 mmol/L    Potassium 3.7 3.5 - 5.0 mmol/L    Chloride 100 98 - 107 mmol/L    CO2 23 22 - 29 mmol/L    Anion Gap 13 7 - 16 mmol/L    Glucose 84 74 - 99 mg/dL    BUN 8 6 - 20 mg/dL    Creatinine 0.8 0.5 - 1.0 mg/dL    Est, Glom Filt Rate >60 >=60 mL/min/1.73    Calcium 9.1 8.6 - 10.2 mg/dL    Total Protein 7.5 6.4 - 8.3 g/dL    Albumin 4.3 3.5 - 5.2 g/dL    Total Bilirubin 0.4 0.0 - 1.2 mg/dL    Alkaline Phosphatase 51 35 - 104 U/L    ALT 7 0 - 32 U/L    AST 12 0 - 31 U/L   CBC with Auto Differential   Result Value Ref Range    WBC 7.5 4.5 - 11.5 E9/L    RBC 4.84 3.50 - 5.50 E12/L    Hemoglobin 13.7 11.5 - 15.5 g/dL    Hematocrit 40.2 34.0 - 48.0 %    MCV 83.1 80.0 - 99.9 fL    MCH 28.3 26.0 - 35.0 pg    MCHC 34.1 32.0 - 34.5 %    RDW 14.6 11.5 - 15.0 fL    Platelets 106 046 - 940 E9/L    MPV 11.5 7.0 - 12.0 fL    Neutrophils % 64.9 43.0 - 80.0 %    Immature Granulocytes % 0.4 0.0 - 5.0 %    Lymphocytes % 29.1 20.0 - 42.0 %    Monocytes % 4.5 2.0 - 12.0 %    Eosinophils % 0.7 0.0 - 6.0 %    Basophils % 0.4 0.0 -
Differential   Result Value Ref Range    WBC 7.5 4.5 - 11.5 E9/L    RBC 4.84 3.50 - 5.50 E12/L    Hemoglobin 13.7 11.5 - 15.5 g/dL    Hematocrit 40.2 34.0 - 48.0 %    MCV 83.1 80.0 - 99.9 fL    MCH 28.3 26.0 - 35.0 pg    MCHC 34.1 32.0 - 34.5 %    RDW 14.6 11.5 - 15.0 fL    Platelets 922 752 - 605 E9/L    MPV 11.5 7.0 - 12.0 fL    Neutrophils % 64.9 43.0 - 80.0 %    Immature Granulocytes % 0.4 0.0 - 5.0 %    Lymphocytes % 29.1 20.0 - 42.0 %    Monocytes % 4.5 2.0 - 12.0 %    Eosinophils % 0.7 0.0 - 6.0 %    Basophils % 0.4 0.0 - 2.0 %    Neutrophils Absolute 4.88 1.80 - 7.30 E9/L    Immature Granulocytes # 0.03 E9/L    Lymphocytes Absolute 2.19 1.50 - 4.00 E9/L    Monocytes Absolute 0.34 0.10 - 0.95 E9/L    Eosinophils Absolute 0.05 0.05 - 0.50 E9/L    Basophils Absolute 0.03 0.00 - 0.20 E9/L   D-Dimer, Quantitative   Result Value Ref Range    D-Dimer, Quant 294 ng/mL DDU   Magnesium   Result Value Ref Range    Magnesium 1.8 1.6 - 2.6 mg/dL   Lipase   Result Value Ref Range    Lipase 20 13 - 60 U/L   Troponin   Result Value Ref Range    Troponin, High Sensitivity <6 0 - 9 ng/L   Urinalysis with Microscopic   Result Value Ref Range    Color, UA Yellow Straw/Yellow    Clarity, UA SL CLOUDY Clear    Glucose, Ur Negative Negative mg/dL    Bilirubin Urine SMALL (A) Negative    Ketones, Urine 15 (A) Negative mg/dL    Specific Gravity, UA >=1.030 1.005 - 1.030    Blood, Urine LARGE (A) Negative    pH, UA 5.5 5.0 - 9.0    Protein, UA TRACE Negative mg/dL    Urobilinogen, Urine 0.2 <2.0 E.U./dL    Nitrite, Urine Negative Negative    Leukocyte Esterase, Urine TRACE (A) Negative    WBC, UA 10-20 (A) 0 - 5 /HPF    RBC, UA 5-10 (A) 0 - 2 /HPF    Epithelial Cells, UA MODERATE /HPF    Bacteria, UA MODERATE (A) None Seen /HPF   EKG 12 Lead   Result Value Ref Range    Ventricular Rate 113 BPM    Atrial Rate 113 BPM    P-R Interval 152 ms    QRS Duration 66 ms    Q-T Interval 314 ms    QTc Calculation (Bazett) 430 ms    P Axis 57

## 2023-06-09 LAB
EKG ATRIAL RATE: 113 BPM
EKG P AXIS: 57 DEGREES
EKG P-R INTERVAL: 152 MS
EKG Q-T INTERVAL: 314 MS
EKG QRS DURATION: 66 MS
EKG QTC CALCULATION (BAZETT): 430 MS
EKG R AXIS: 13 DEGREES
EKG T AXIS: 31 DEGREES
EKG VENTRICULAR RATE: 113 BPM

## 2023-06-09 PROCEDURE — 93010 ELECTROCARDIOGRAM REPORT: CPT | Performed by: INTERNAL MEDICINE

## 2023-06-10 LAB — BACTERIA UR CULT: NORMAL

## 2023-11-17 ENCOUNTER — HOSPITAL ENCOUNTER (OUTPATIENT)
Age: 33
Discharge: HOME | End: 2023-11-17
Payer: MEDICAID

## 2023-11-17 ENCOUNTER — HOSPITAL ENCOUNTER (OUTPATIENT)
Dept: GENERAL RADIOLOGY | Age: 33
End: 2023-11-17
Payer: MEDICAID

## 2023-11-17 ENCOUNTER — HOSPITAL ENCOUNTER (OUTPATIENT)
Age: 33
End: 2023-11-17
Payer: MEDICAID

## 2023-11-17 DIAGNOSIS — K59.00 CONSTIPATION, UNSPECIFIED CONSTIPATION TYPE: ICD-10-CM

## 2023-11-17 LAB — IGA SERPL-MCNC: 50 MG/DL (ref 70–400)

## 2023-11-17 PROCEDURE — 36415 COLL VENOUS BLD VENIPUNCTURE: CPT

## 2023-11-17 PROCEDURE — 82784 ASSAY IGA/IGD/IGG/IGM EACH: CPT

## 2023-11-17 PROCEDURE — 74018 RADEX ABDOMEN 1 VIEW: CPT

## 2023-11-17 PROCEDURE — 83516 IMMUNOASSAY NONANTIBODY: CPT

## 2023-11-25 LAB
GLIADIN IGA SER IA-ACNC: 0.3 U/ML
GLIADIN IGG SER IA-ACNC: <0.4 U/ML
TISSUE TRANSGLUTAMINASE ANTIBODY IGG: <0.6 U/ML
TTG IGA SER IA-ACNC: <0.1 U/ML

## 2024-01-16 ENCOUNTER — OFFICE VISIT (OUTPATIENT)
Dept: OBGYN | Age: 34
End: 2024-01-16
Payer: MEDICAID

## 2024-01-16 VITALS
DIASTOLIC BLOOD PRESSURE: 88 MMHG | WEIGHT: 140.7 LBS | BODY MASS INDEX: 24.93 KG/M2 | HEIGHT: 63 IN | HEART RATE: 124 BPM | SYSTOLIC BLOOD PRESSURE: 115 MMHG

## 2024-01-16 DIAGNOSIS — Z01.419 ENCOUNTER FOR ANNUAL ROUTINE GYNECOLOGICAL EXAMINATION: Primary | ICD-10-CM

## 2024-01-16 DIAGNOSIS — N89.8 VAGINAL DISCHARGE: ICD-10-CM

## 2024-01-16 PROCEDURE — 99385 PREV VISIT NEW AGE 18-39: CPT | Performed by: OBSTETRICS & GYNECOLOGY

## 2024-01-16 PROCEDURE — 99203 OFFICE O/P NEW LOW 30 MIN: CPT | Performed by: OBSTETRICS & GYNECOLOGY

## 2024-01-16 RX ORDER — HYDROXYZINE 50 MG/1
TABLET, FILM COATED ORAL
COMMUNITY

## 2024-01-16 RX ORDER — NORETHINDRONE ACETATE AND ETHINYL ESTRADIOL 1; 20 MG/1; UG/1
1 TABLET ORAL DAILY
COMMUNITY
Start: 2023-12-26

## 2024-01-16 RX ORDER — PLECANATIDE 3 MG/1
TABLET ORAL
COMMUNITY
Start: 2024-01-09

## 2024-01-16 RX ORDER — AMOXICILLIN 250 MG
CAPSULE ORAL
COMMUNITY
Start: 2023-04-14

## 2024-01-16 RX ORDER — FAMOTIDINE 20 MG/1
20 TABLET, FILM COATED ORAL 2 TIMES DAILY
COMMUNITY
Start: 2023-12-28

## 2024-01-16 RX ORDER — GLYCOPYRROLATE 1 MG/1
1 TABLET ORAL EVERY 8 HOURS
COMMUNITY
Start: 2023-12-26

## 2024-01-16 SDOH — ECONOMIC STABILITY: HOUSING INSECURITY
IN THE LAST 12 MONTHS, WAS THERE A TIME WHEN YOU DID NOT HAVE A STEADY PLACE TO SLEEP OR SLEPT IN A SHELTER (INCLUDING NOW)?: NO

## 2024-01-16 SDOH — ECONOMIC STABILITY: INCOME INSECURITY: HOW HARD IS IT FOR YOU TO PAY FOR THE VERY BASICS LIKE FOOD, HOUSING, MEDICAL CARE, AND HEATING?: NOT HARD AT ALL

## 2024-01-16 SDOH — ECONOMIC STABILITY: FOOD INSECURITY: WITHIN THE PAST 12 MONTHS, THE FOOD YOU BOUGHT JUST DIDN'T LAST AND YOU DIDN'T HAVE MONEY TO GET MORE.: NEVER TRUE

## 2024-01-16 SDOH — ECONOMIC STABILITY: FOOD INSECURITY: WITHIN THE PAST 12 MONTHS, YOU WORRIED THAT YOUR FOOD WOULD RUN OUT BEFORE YOU GOT MONEY TO BUY MORE.: NEVER TRUE

## 2024-01-16 ASSESSMENT — ENCOUNTER SYMPTOMS
WHEEZING: 0
DIARRHEA: 1
COUGH: 0
SHORTNESS OF BREATH: 0
NAUSEA: 1
VOMITING: 1
BLOOD IN STOOL: 0
ABDOMINAL PAIN: 0
CONSTIPATION: 1

## 2024-01-16 ASSESSMENT — PATIENT HEALTH QUESTIONNAIRE - PHQ9
8. MOVING OR SPEAKING SO SLOWLY THAT OTHER PEOPLE COULD HAVE NOTICED. OR THE OPPOSITE, BEING SO FIGETY OR RESTLESS THAT YOU HAVE BEEN MOVING AROUND A LOT MORE THAN USUAL: 1
5. POOR APPETITE OR OVEREATING: 1
6. FEELING BAD ABOUT YOURSELF - OR THAT YOU ARE A FAILURE OR HAVE LET YOURSELF OR YOUR FAMILY DOWN: 1
9. THOUGHTS THAT YOU WOULD BE BETTER OFF DEAD, OR OF HURTING YOURSELF: 0
2. FEELING DOWN, DEPRESSED OR HOPELESS: 1
10. IF YOU CHECKED OFF ANY PROBLEMS, HOW DIFFICULT HAVE THESE PROBLEMS MADE IT FOR YOU TO DO YOUR WORK, TAKE CARE OF THINGS AT HOME, OR GET ALONG WITH OTHER PEOPLE: 1
SUM OF ALL RESPONSES TO PHQ QUESTIONS 1-9: 14
SUM OF ALL RESPONSES TO PHQ QUESTIONS 1-9: 14
3. TROUBLE FALLING OR STAYING ASLEEP: 3
1. LITTLE INTEREST OR PLEASURE IN DOING THINGS: 1
SUM OF ALL RESPONSES TO PHQ QUESTIONS 1-9: 14
SUM OF ALL RESPONSES TO PHQ9 QUESTIONS 1 & 2: 2
7. TROUBLE CONCENTRATING ON THINGS, SUCH AS READING THE NEWSPAPER OR WATCHING TELEVISION: 3
SUM OF ALL RESPONSES TO PHQ QUESTIONS 1-9: 14
4. FEELING TIRED OR HAVING LITTLE ENERGY: 3
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
## 2024-01-16 NOTE — PROGRESS NOTES
New patient here to establish care. Here for annual. Patient has not been to gyno in a while does not remember the DR name. Patient is not currently having periods due to cycle control. Patient has no concerns at this time. Pap and culture obtained, labeled and sent to lab. Discharge instructions given to patient. Advised patient to call office if any concerns. Patient voiced understanding

## 2024-01-18 ENCOUNTER — TELEPHONE (OUTPATIENT)
Dept: OBGYN | Age: 34
End: 2024-01-18

## 2024-01-18 NOTE — TELEPHONE ENCOUNTER
----- Message from Nohemi Sotelo DO sent at 1/18/2024 11:59 AM EST -----  Please let her know the vaginal culture was neg. Pap still pending

## 2024-01-18 NOTE — TELEPHONE ENCOUNTER
Nutrition Progress Note  (Physician Action Needed)    Physician- please edit note, check the appropriate diagnosis from list below and indicate whether you agree with the plan of care    The registered dietitian has identified that this patient meets criteria for malnutrition.    Nutrition Diagnosis:   Severe Protein Calorie Malnutrition related to patient many dietary limitation (requiring gluten free, lactose free, reporting egg allergy) and dysphagia as evidenced by diet recall revealing estimated energy intake < 75% of estimated energy needs for > 1 month (patient able to verbalize few foods accepted/tolerated); severe fat loss to orbitals, triceps, severe muscle mass loss to temporalis, pectoral, deltoid, interosseous, gastrocnemius, quadricep; patient underweight BMI 16.8.    Intervention:  Modified diet:  Gluten free, Lactose restricted, Dysphagia 2/Ground/Minced diet.   Other:  RD attempted to clarify/confirm lactose restriction need and egg allergy as patient has limited amounts of foods that are compliant with diet order.  Patient difficult historian and describes fear of eating lactose or egg containing foods.  RD was not able to liberalize diet order.  Specific foods/beverages or groups:  RD encouraged patient to order a protein source (which patient is familiar with) for each meal and emphasized need to eat small amounts often.  Explained importance of promoting nutrition status.    Physician Documentation  Based on above, patient meets criteria for:    [x]  Severe Protein Calorie Malnutrition  []  Moderate Protein Calorie Malnutrition  []  Mild Protein Calorie Malnutrition  [] Unable to determine   [] Other:     * Please add the appropriate diagnosis to the patient's Problem List and subsequent Progress Notes.     Plan of care:   [x]  Agree with nutrition assessment and plan of care as stated above.    [] Agree with nutrition assessment and plan of care, with exception. Patient meets criteria for  notified   diagnosis except ________.    [] Disagree with nutrition assessment and plan of care because_________.

## 2024-01-20 LAB
CULTURE: NORMAL
GYNECOLOGY CYTOLOGY REPORT: NORMAL
HPV SAMPLE: NORMAL
HPV SOURCE: NORMAL
HPV, GENOTYPE 16: NOT DETECTED
HPV, GENOTYPE 18: NOT DETECTED
HPV, HIGH RISK OTHER: NOT DETECTED
HPV, INTERPRETATION: NORMAL
SPECIMEN DESCRIPTION: NORMAL

## 2024-01-22 ENCOUNTER — TELEPHONE (OUTPATIENT)
Dept: OBGYN | Age: 34
End: 2024-01-22

## 2024-01-22 NOTE — TELEPHONE ENCOUNTER
----- Message from Nohemi Sotelo DO sent at 1/21/2024  7:29 AM EST -----  please let her know her vaginal culture was neg. no infection

## 2024-01-22 NOTE — TELEPHONE ENCOUNTER
----- Message from Nohemi Sotelo DO sent at 1/21/2024  7:31 AM EST -----  Please let her know pap and HPV neg

## 2024-04-20 ENCOUNTER — TRANSCRIBE ORDERS (OUTPATIENT)
Dept: ADMINISTRATIVE | Age: 34
End: 2024-04-20

## 2024-04-20 DIAGNOSIS — R11.0 NAUSEA: Primary | ICD-10-CM

## 2024-05-08 ENCOUNTER — HOSPITAL ENCOUNTER (OUTPATIENT)
Dept: ULTRASOUND IMAGING | Age: 34
Discharge: HOME | End: 2024-05-10
Payer: MEDICAID

## 2024-05-08 DIAGNOSIS — R11.0 NAUSEA: ICD-10-CM

## 2024-05-08 PROCEDURE — 76705 ECHO EXAM OF ABDOMEN: CPT

## 2025-08-06 ENCOUNTER — TELEPHONE (OUTPATIENT)
Dept: ENDOSCOPY | Age: 35
End: 2025-08-06

## 2025-08-11 ENCOUNTER — HOSPITAL ENCOUNTER (OUTPATIENT)
Age: 35
Setting detail: OUTPATIENT SURGERY
Discharge: HOME | End: 2025-08-11
Attending: INTERNAL MEDICINE | Admitting: INTERNAL MEDICINE
Payer: MEDICAID

## 2025-08-11 PROCEDURE — 3609019000 HC CAPSULE ENDOSCOPY: Performed by: INTERNAL MEDICINE

## 2025-08-11 PROCEDURE — 6370000000 HC RX 637 (ALT 250 FOR IP): Performed by: INTERNAL MEDICINE

## 2025-08-11 PROCEDURE — 3609015500 HC GASTRIC/DUODENAL MOTILITY &/OR MANOMETRY STUDY: Performed by: INTERNAL MEDICINE

## 2025-08-11 RX ORDER — LIDOCAINE HYDROCHLORIDE 20 MG/ML
JELLY TOPICAL PRN
Status: DISCONTINUED | OUTPATIENT
Start: 2025-08-11 | End: 2025-08-11 | Stop reason: ALTCHOICE